# Patient Record
Sex: FEMALE | Race: BLACK OR AFRICAN AMERICAN | NOT HISPANIC OR LATINO | Employment: FULL TIME | URBAN - METROPOLITAN AREA
[De-identification: names, ages, dates, MRNs, and addresses within clinical notes are randomized per-mention and may not be internally consistent; named-entity substitution may affect disease eponyms.]

---

## 2018-04-30 ENCOUNTER — HOSPITAL ENCOUNTER (OUTPATIENT)
Dept: ONCOLOGY | Facility: HOSPITAL | Age: 23
End: 2018-05-04
Attending: INTERNAL MEDICINE | Admitting: INTERNAL MEDICINE

## 2018-04-30 LAB
ABS NEUT (OLG): 8.16 X10(3)/MCL (ref 2.1–9.2)
ALBUMIN SERPL-MCNC: 3.6 GM/DL (ref 3.4–5)
ALBUMIN/GLOB SERPL: 0.9 {RATIO}
ALP SERPL-CCNC: 60 UNIT/L (ref 38–126)
ALT SERPL-CCNC: 26 UNIT/L (ref 12–78)
APPEARANCE, UA: ABNORMAL
AST SERPL-CCNC: 31 UNIT/L (ref 15–37)
BACTERIA SPEC CULT: ABNORMAL /HPF
BASOPHILS # BLD AUTO: 0 X10(3)/MCL (ref 0–0.2)
BASOPHILS NFR BLD AUTO: 0 %
BILIRUB SERPL-MCNC: 1.5 MG/DL (ref 0.2–1)
BILIRUB UR QL STRIP: ABNORMAL
BILIRUBIN DIRECT+TOT PNL SERPL-MCNC: 0.4 MG/DL (ref 0–0.2)
BILIRUBIN DIRECT+TOT PNL SERPL-MCNC: 1.1 MG/DL (ref 0–0.8)
BUN SERPL-MCNC: 8 MG/DL (ref 7–18)
CALCIUM SERPL-MCNC: 9 MG/DL (ref 8.5–10.1)
CHLORIDE SERPL-SCNC: 104 MMOL/L (ref 98–107)
CO2 SERPL-SCNC: 25 MMOL/L (ref 21–32)
COLOR UR: ABNORMAL
CREAT SERPL-MCNC: 0.81 MG/DL (ref 0.55–1.02)
ERYTHROCYTE [DISTWIDTH] IN BLOOD BY AUTOMATED COUNT: 13.3 % (ref 11.5–17)
GLOBULIN SER-MCNC: 4.1 GM/DL (ref 2.4–3.5)
GLUCOSE (UA): NEGATIVE
GLUCOSE SERPL-MCNC: 130 MG/DL (ref 74–106)
HCT VFR BLD AUTO: 40.1 % (ref 37–47)
HGB BLD-MCNC: 12.8 GM/DL (ref 12–16)
HGB UR QL STRIP: ABNORMAL
KETONES UR QL STRIP: ABNORMAL
LEUKOCYTE ESTERASE UR QL STRIP: ABNORMAL
LIPASE SERPL-CCNC: 70 UNIT/L (ref 73–393)
LYMPHOCYTES # BLD AUTO: 1.1 X10(3)/MCL (ref 0.6–4.6)
LYMPHOCYTES NFR BLD AUTO: 11 %
MCH RBC QN AUTO: 29 PG (ref 27–31)
MCHC RBC AUTO-ENTMCNC: 31.9 GM/DL (ref 33–36)
MCV RBC AUTO: 90.9 FL (ref 80–94)
MONOCYTES # BLD AUTO: 0.7 X10(3)/MCL (ref 0.1–1.3)
MONOCYTES NFR BLD AUTO: 7 %
NEUTROPHILS # BLD AUTO: 8.16 X10(3)/MCL (ref 2.1–9.2)
NEUTROPHILS NFR BLD AUTO: 81 %
NITRITE UR QL STRIP: POSITIVE
PH UR STRIP: 6 [PH] (ref 5–9)
PLATELET # BLD AUTO: 199 X10(3)/MCL (ref 130–400)
PMV BLD AUTO: 10.7 FL (ref 9.4–12.4)
POTASSIUM SERPL-SCNC: 3.8 MMOL/L (ref 3.5–5.1)
PROT SERPL-MCNC: 7.7 GM/DL (ref 6.4–8.2)
PROT UR QL STRIP: ABNORMAL
RBC # BLD AUTO: 4.41 X10(6)/MCL (ref 4.2–5.4)
RBC #/AREA URNS HPF: 13 /HPF (ref 0–2)
SODIUM SERPL-SCNC: 137 MMOL/L (ref 136–145)
SP GR UR STRIP: 1.02 (ref 1–1.03)
SQUAMOUS EPITHELIAL, UA: 6 /HPF (ref 0–4)
UROBILINOGEN UR STRIP-ACNC: 2
WBC # SPEC AUTO: 10 X10(3)/MCL (ref 4.5–11.5)
WBC #/AREA URNS HPF: 735 /HPF (ref 0–3)

## 2018-05-02 LAB
BUN SERPL-MCNC: 7 MG/DL (ref 7–18)
CALCIUM SERPL-MCNC: 8.7 MG/DL (ref 8.5–10.1)
CHLORIDE SERPL-SCNC: 105 MMOL/L (ref 98–107)
CO2 SERPL-SCNC: 27 MMOL/L (ref 21–32)
CREAT SERPL-MCNC: 0.63 MG/DL (ref 0.55–1.02)
CREAT/UREA NIT SERPL: 11.1
ERYTHROCYTE [DISTWIDTH] IN BLOOD BY AUTOMATED COUNT: 13.2 % (ref 11.5–17)
GLUCOSE SERPL-MCNC: 111 MG/DL (ref 74–106)
HCT VFR BLD AUTO: 36.7 % (ref 37–47)
HGB BLD-MCNC: 11.3 GM/DL (ref 12–16)
MCH RBC QN AUTO: 28.6 PG (ref 27–31)
MCHC RBC AUTO-ENTMCNC: 30.8 GM/DL (ref 33–36)
MCV RBC AUTO: 92.9 FL (ref 80–94)
PLATELET # BLD AUTO: 162 X10(3)/MCL (ref 130–400)
PMV BLD AUTO: 10.7 FL (ref 9.4–12.4)
POTASSIUM SERPL-SCNC: 3.7 MMOL/L (ref 3.5–5.1)
RBC # BLD AUTO: 3.95 X10(6)/MCL (ref 4.2–5.4)
SODIUM SERPL-SCNC: 141 MMOL/L (ref 136–145)
WBC # SPEC AUTO: 7.2 X10(3)/MCL (ref 4.5–11.5)

## 2018-05-03 LAB
ABS NEUT (OLG): 4.06 X10(3)/MCL (ref 2.1–9.2)
BASOPHILS # BLD AUTO: 0 X10(3)/MCL (ref 0–0.2)
BASOPHILS NFR BLD AUTO: 0 %
BUN SERPL-MCNC: 6 MG/DL (ref 7–18)
CALCIUM SERPL-MCNC: 8.6 MG/DL (ref 8.5–10.1)
CHLORIDE SERPL-SCNC: 107 MMOL/L (ref 98–107)
CO2 SERPL-SCNC: 23 MMOL/L (ref 21–32)
CREAT SERPL-MCNC: 0.64 MG/DL (ref 0.55–1.02)
CREAT/UREA NIT SERPL: 9.4
EOSINOPHIL # BLD AUTO: 0.1 X10(3)/MCL (ref 0–0.9)
EOSINOPHIL NFR BLD AUTO: 2 %
ERYTHROCYTE [DISTWIDTH] IN BLOOD BY AUTOMATED COUNT: 13.3 % (ref 11.5–17)
GLUCOSE SERPL-MCNC: 93 MG/DL (ref 74–106)
HCT VFR BLD AUTO: 35.2 % (ref 37–47)
HGB BLD-MCNC: 11.1 GM/DL (ref 12–16)
LYMPHOCYTES # BLD AUTO: 1.6 X10(3)/MCL (ref 0.6–4.6)
LYMPHOCYTES NFR BLD AUTO: 25 %
MCH RBC QN AUTO: 28.3 PG (ref 27–31)
MCHC RBC AUTO-ENTMCNC: 31.5 GM/DL (ref 33–36)
MCV RBC AUTO: 89.8 FL (ref 80–94)
MONOCYTES # BLD AUTO: 0.5 X10(3)/MCL (ref 0.1–1.3)
MONOCYTES NFR BLD AUTO: 8 %
NEUTROPHILS # BLD AUTO: 4.06 X10(3)/MCL (ref 2.1–9.2)
NEUTROPHILS NFR BLD AUTO: 64 %
PLATELET # BLD AUTO: 187 X10(3)/MCL (ref 130–400)
PMV BLD AUTO: 11.5 FL (ref 9.4–12.4)
POTASSIUM SERPL-SCNC: 3.5 MMOL/L (ref 3.5–5.1)
RBC # BLD AUTO: 3.92 X10(6)/MCL (ref 4.2–5.4)
SODIUM SERPL-SCNC: 139 MMOL/L (ref 136–145)
WBC # SPEC AUTO: 6.3 X10(3)/MCL (ref 4.5–11.5)

## 2018-05-04 LAB
ABS NEUT (OLG): 2.77 X10(3)/MCL (ref 2.1–9.2)
ALBUMIN SERPL-MCNC: 2.8 GM/DL (ref 3.4–5)
ALBUMIN/GLOB SERPL: 0.7 RATIO (ref 1.1–2)
ALP SERPL-CCNC: 58 UNIT/L (ref 38–126)
ALT SERPL-CCNC: 33 UNIT/L (ref 12–78)
AST SERPL-CCNC: 28 UNIT/L (ref 15–37)
BILIRUB SERPL-MCNC: 0.6 MG/DL (ref 0.2–1)
BILIRUBIN DIRECT+TOT PNL SERPL-MCNC: 0.1 MG/DL (ref 0–0.5)
BILIRUBIN DIRECT+TOT PNL SERPL-MCNC: 0.5 MG/DL (ref 0–0.8)
BUN SERPL-MCNC: 6 MG/DL (ref 7–18)
CALCIUM SERPL-MCNC: 8.6 MG/DL (ref 8.5–10.1)
CHLORIDE SERPL-SCNC: 107 MMOL/L (ref 98–107)
CO2 SERPL-SCNC: 24 MMOL/L (ref 21–32)
CREAT SERPL-MCNC: 0.55 MG/DL (ref 0.55–1.02)
EOSINOPHIL NFR BLD MANUAL: 7 % (ref 0–8)
ERYTHROCYTE [DISTWIDTH] IN BLOOD BY AUTOMATED COUNT: 13.2 % (ref 11.5–17)
GLOBULIN SER-MCNC: 3.8 GM/DL (ref 2.4–3.5)
GLUCOSE SERPL-MCNC: 102 MG/DL (ref 74–106)
HCT VFR BLD AUTO: 33.3 % (ref 37–47)
HGB BLD-MCNC: 10.5 GM/DL (ref 12–16)
LYMPHOCYTES NFR BLD MANUAL: 32 % (ref 13–40)
MAGNESIUM SERPL-MCNC: 2.1 MG/DL (ref 1.8–2.4)
MCH RBC QN AUTO: 28.5 PG (ref 27–31)
MCHC RBC AUTO-ENTMCNC: 31.5 GM/DL (ref 33–36)
MCV RBC AUTO: 90.2 FL (ref 80–94)
MONOCYTES NFR BLD MANUAL: 8 % (ref 2–11)
NEUTROPHILS NFR BLD MANUAL: 53 % (ref 47–80)
PLATELET # BLD AUTO: 197 X10(3)/MCL (ref 130–400)
PLATELET # BLD EST: NORMAL 10*3/UL
PMV BLD AUTO: 10.9 FL (ref 7.4–10.4)
POTASSIUM SERPL-SCNC: 3.9 MMOL/L (ref 3.5–5.1)
PROT SERPL-MCNC: 6.6 GM/DL (ref 6.4–8.2)
RBC # BLD AUTO: 3.69 X10(6)/MCL (ref 4.2–5.4)
SODIUM SERPL-SCNC: 140 MMOL/L (ref 136–145)
WBC # SPEC AUTO: 5.1 X10(3)/MCL (ref 4.5–11.5)

## 2021-09-29 LAB
PAP RECOMMENDATION EXT: NORMAL
PAP SMEAR: NORMAL

## 2022-01-02 ENCOUNTER — HISTORICAL (OUTPATIENT)
Dept: ADMINISTRATIVE | Facility: HOSPITAL | Age: 27
End: 2022-01-02

## 2022-01-02 LAB
FLUAV AG UPPER RESP QL IA.RAPID: NEGATIVE
FLUBV AG UPPER RESP QL IA.RAPID: NEGATIVE
SARS-COV-2 RNA RESP QL NAA+PROBE: DETECTED

## 2022-02-17 ENCOUNTER — HISTORICAL (OUTPATIENT)
Dept: ADMINISTRATIVE | Facility: HOSPITAL | Age: 27
End: 2022-02-17

## 2022-04-11 ENCOUNTER — HISTORICAL (OUTPATIENT)
Dept: ADMINISTRATIVE | Facility: HOSPITAL | Age: 27
End: 2022-04-11
Payer: COMMERCIAL

## 2022-04-28 VITALS
SYSTOLIC BLOOD PRESSURE: 144 MMHG | WEIGHT: 293 LBS | OXYGEN SATURATION: 100 % | HEIGHT: 72 IN | BODY MASS INDEX: 39.68 KG/M2 | DIASTOLIC BLOOD PRESSURE: 90 MMHG

## 2022-04-30 NOTE — DISCHARGE SUMMARY
Patient:   Joana Gallo             MRN: [id number]            FIN: [id number]               Age:   23 years     Sex:  Female     :  1995   Associated Diagnoses:   None   Author:   Jamie RADER, Ata MURILLO      Discharge Information      Discharge Summary Information   ADMIT/DISCHARGE DATE   Admit Date: 2018  Discharge Date: 2018     Procedures   No procedures recorded for this visit.      Hospital Course   Admission/discharge diagnosees    Acute non-complicated pyelonephritis  Sepsis secondary to E. coli pyelonephritis    Hospital course: 23-year-old woman who presented to the ED on 2018 with fever and flank pain. On the ER admission, she was febrile and tachycardic. Urinalysis was consistent with an acute cystitis. A CT scan of the abdomen demonstrated acute pyelonephritis. She was admitted and started on intravenous anti-infective therapy. A pelvic ultrasound was negative.  Urine culture grew out pansensitive E. coli.  She will be discharged on a 7-10 day course with Levaquin.       Physical Examination      Vital Signs (last 24 hrs)_____  Last Charted___________  Temp Oral     36.6 DegC  (MAY 04 05:18)  Heart Rate Peripheral   75 bpm  (MAY 04 05:18)  Resp Rate         18 br/min  (MAY 03 20:00)  SBP      113 mmHg  (MAY 04 05:18)  DBP      72 mmHg  (MAY 04 05:18)  SpO2      97 %  (MAY 04 05:18)     General:  Alert and oriented, No acute distress.    Neck:  Supple, Non-tender.    Respiratory:  Lungs are clear to auscultation, Respirations are non-labored.    Cardiovascular:  Normal rate, Regular rhythm.    Gastrointestinal:  Soft, Non-tender.       Discharge Plan   Discharge Summary Plan   Discharge disposition: discharge to home.     Orders     Orders   Patient Care:  Give all scheduled vaccinations prior to discharge. (Order): 2018 7:05 CDT, Give all scheduled vaccinations prior to discharge.  Discontinue IV (Order): 2018 7:05 CDT  Pharmacy:  Levaquin 500 mg oral tablet  (Prescribe): 500 mg = 1 tab(s), Oral, q24hr, X 5 day(s), # 5 tab(s), 0 Refill(s)  METRONIDAZOLE 500 MG TABLET (Discontinue): 5/4/2018 7:03 CDT  Hydroco-Acetamin 7.5-325 Tab (Discontinue): 5/4/2018 7:03 CDT  FLUCONAZOLE 150 MG TABLET (Discontinue): 5/4/2018 7:03 CDT  Ibuprofen 800 Mg Tablet (Discontinue): 5/4/2018 7:03 CDT  Amoxicillin 500 Mg Capsule (Discontinue): 5/4/2018 7:03 CDT  naproxen 375 mg oral tablet (Discontinue): 5/4/2018 7:03 CDT  Keflex 500 mg oral capsule (Discontinue): 5/4/2018 7:04 CDT  Admit/Transfer/Discharge:  Discharge Activity (Order): Exercise as Tolerated  Discharge (Order): Home, Give all scheduled vaccinations prior to discharge..        Education and Follow-up   Counseled: patient, family, regarding diagnosis, regarding treatment, regarding medications.     Discharge Planning: Pyelonephritis, Adult, Follow up with primary care provider Within 1 week, time spent on discharge disposition included the following: final examination of the patient; discussion of the hospital stay; instructions for continuing care; final diagnoses; patient/family counseling; preparation of discharge records; preparation of prescriptions; referral forms; chart review.  Total time spent on discharge disposition was 33 minutes.   .

## 2022-04-30 NOTE — H&P
Patient:   Joana Gallo             MRN: [id number]            FIN: [id number]               Age:   23 years     Sex:  Female     :  1995   Associated Diagnoses:   None   Author:   Shoaib Hannah MD      DATE OF ADMIT: 2018 5:14  REFERRAL SOURCE: Natalie Eldridge MD   PCP: Non-staff MD     CHIEF COMPLAINT: fever and flank pain     HISTORY OF PRESENTING ILLNESS  Patient is a 23-year-old female with no past medical history report presented to the ER complaining of fever and flank pain.  She reported that since this past Friday she has been having episodic fever, and bilateral flank pain that is nonradiating.  She denied any nausea or vomiting.  Her symptoms progress or she reported to the ER this evening for further evaluation.  On arrival to the ER she was febrile at 38.4 and tachycardic but hemodynamically stable.  Laboratory work only significant for urinary tract infection with 735 WBCs in her urine and 3+ leukocyte esterase and positive nitrites.  A CT of her abdomen was obtained which showed increased enhancement in the right kidney concerning for pyelonephritis.  She is admitted to the hospitalist service for pyelonephritis, and apparently the patient was very nauseated in the ER and unable to tolerate anything by mouth, and for this reason will be admitted for IV antibiotics.  Currently at this time she states she is not nauseated and had no vomiting.    REVIEW OF SYSTEMS  Except as documented, all other systems reviewed and negative    PAST MEDICAL HISTORY  None    PAST SURGICAL HISTORY  None reported    FAMILY HISTORY  Reviewed, noncontributory to current condition.    SOCIAL HISTORY  Denies tobacco and drug use, occasional alcohol use    ALLERGIES  No Known Medication Allergies    HOME MEDICATIONS  Provera 10 mg tablet 10 mg = 1 tab(s), Oral, Daily    PHYSICAL EXAM  Temp Oral     37.5 DegC  ( 23:55)  Heart Rate Peripheral   92 bpm  (MAY 01 00:00)  Resp Rate         22 br/min   (MAY 01 00:00)  SBP      114 mmHg  (MAY 01 00:00)  DBP      L 59mmHg  (MAY 01 00:00)  SpO2   99 %  (MAY 01 00:00)  GENERAL: awake, alert, oriented and in no acute distress, morbidly obese  HEENT: NC/AT, EOMI, Pupils equal, CN 2-12 intact   LUNGS: CTA B/L, no rales or rhonchi, moving air well with no  resp distress  CVS: Regular rate and rhythm, normal peripheral perfusion  ABD: Soft, non-tender, non-distended, bowel sounds present  EXTREMITIES: no clubbing or cyanosis  SKIN: Warm, dry. No rashes or lesions  NEURO: AAOx3, no focal neurological deficit  PSYCHIATRIC: Cooperative    LABS  Chemistry Hematology/Coagulation   Sodium Lvl: 137 mmol/L (04/30/18 17:45:45) WBC: 10 x10(3)/mcL (04/30/18 17:10:19)   Potassium Lvl: 3.8 mmol/L (04/30/18 17:45:45) RBC: 4.41 x10(6)/mcL (04/30/18 17:10:19)   Chloride: 104 mmol/L (04/30/18 17:45:45) Hgb: 12.8 gm/dL (04/30/18 17:10:19)   CO2: 25 mmol/L (04/30/18 17:45:45) Hct: 40.1 % (04/30/18 17:10:19)   Calcium Lvl: 9 mg/dL (04/30/18 17:45:45) Platelet: 199 x10(3)/mcL (04/30/18 17:10:19)   Glucose Lvl: 130 mg/dL High (04/30/18 17:45:45) MCV: 90.9 fL (04/30/18 17:10:19)   BUN: 8 mg/dL (04/30/18 17:45:45) MCH: 29 pg (04/30/18 17:10:19)   Creatinine: 0.81 mg/dL (04/30/18 17:45:45) MCHC: 31.9 gm/dL Low (04/30/18 17:10:19)   eGFR-AA: >60 (04/30/18 17:45:46) RDW: 13.3 % (04/30/18 17:10:19)   eGFR-PEGGY: >60 (04/30/18 17:45:48) MPV: 10.7 fL (04/30/18 17:10:19)   Bili Total: 1.5 mg/dL High (04/30/18 17:45:45) Abs Neut: 8.16 x10(3)/mcL (04/30/18 17:10:19)   Bili Direct: 0.4 mg/dL High (04/30/18 17:45:45) Neutro Auto: 81 % (04/30/18 17:10:20)   Bili Indirect: 1.1 mg/dL High (04/30/18 17:45:45) Lymph Auto: 11 % (04/30/18 17:10:20)   AST: 31 unit/L (04/30/18 17:45:45) Mono Auto: 7 % (04/30/18 17:10:20)   ALT: 26 unit/L (04/30/18 17:45:45) Basophil Auto: 0 % (04/30/18 17:10:20)   Alk Phos: 60 unit/L (04/30/18 17:45:45) Abs Neutro: 8.16 x10(3)/mcL (04/30/18 17:10:20)   Total Protein: 7.7 gm/dL  (04/30/18 17:45:45) Abs Lymph: 1.1 x10(3)/mcL (04/30/18 17:10:20)   Albumin Lvl: 3.6 gm/dL (04/30/18 17:45:45) Abs Mono: 0.7 x10(3)/mcL (04/30/18 17:10:20)   Globulin: 4.1 gm/dL High (04/30/18 17:45:45) Abs Baso: 0 x10(3)/mcL (04/30/18 17:10:20)   A/G Ratio: 0.9 (04/30/18 17:45:45)    Lipase Lvl: 70 unit/L Low (04/30/18 17:45:47)      RADIOLOGY  CT of the abdomen showed mildly heterogeneous enhancement of the right kidney, or artifact versus pyelonephritis, correlate with urinalysis      ASSESSMENT  1.  Pyelonephritis  2.  Fever and bilateral flank pain secondary to above  3.  Mildly elevated bilirubin likely secondary to infection  4.  Morbid obesity    PLAN  - Urine culture sent   - Start emperic antibiotics for pyelonephritis  - Continue IV fluids  - Symptomatic management including antinausea medication and pain medication as needed    ADVANCED DIRECTIVES: None full code  VTE PROPHYLAXIS: SCDs and ambulate     Total time spent on admission: 71 minutes

## 2022-04-30 NOTE — ED PROVIDER NOTES
"   Patient:   Joana Gallo             MRN: [id number]            FIN: [id number]               Age:   23 years     Sex:  Female     :  1995   Associated Diagnoses:   Pyelonephritis, acute   Author:   Natalie Eldridge MD      Basic Information   Time seen: Date & time 2018 16:26:00.   History source: Patient.   Arrival mode: Private vehicle, walking.   History limitation: None.      History of Present Illness   The patient presents with Patient states right sided flank pain and fever x several days. denies any nausea or vomiting (allyn, NP).  and     22 yo female presents with fever and right "side" pain for 2-3 days. Symptoms associated with a decreased intake of food due to nausea and she has been able to take only sips of water or Gatorade. The following day, , she got a bad headache at work, along with fever and chills, and constant, stabbing pain on her "right side". The Pt thought it was a bladder infection, took some Advil with minimal relief. Symptoms continue to worsen and thus she presents for evaluation. She denies having prior pelvic or bladder infections. Pt denies vaginal discharge or bleeding, changes in bowel movements, or productive cough. No history of kidney stones.  The onset was 3  days ago.  The course/duration of symptoms is constant.  The character of symptoms is "stabbing".  The degree at onset was moderate.  The Location of pain at onset was right, abdominal and flank.  The degree at present is moderate.  The Location of pain at present is right, abdominal and flank.  Radiating pain: none. The exacerbating factor is eating.  The relieving factor is none.  Therapy today: none.  Risk factors consist of obesity.  Associated symptoms: nausea, fever, chills, headache, decreased PO intake and denies vaginal discharge or bleeding, changes in bowel movements, or productive cough.        Review of Systems   Constitutional symptoms:  fever, chills.   Skin symptoms:  No " rash,    Eye symptoms:  No recent vision problems,    ENMT symptoms:  No sore throat,    Respiratory symptoms:  denies productive cough, No shortness of breath,    Cardiovascular symptoms:  No chest pain, no syncope.    Gastrointestinal symptoms:  nausea, abdominal pain, decreased PO intake, denies changes in bowel movements, no vomiting, no diarrhea, no constipation.    Genitourinary symptoms:  denies vaginal bleeding or discharge, No dysuria,    Neurologic symptoms:  headache.      Health Status   Allergies: No known allergies.   Medications:  (Selected)   Documented Medications  Documented  Provera 10 mg tablet:   metformin 1,000 mg tablet: .   Immunizations: Per nurse's notes.      Past Medical/ Family/ Social History   Medical history: Negative.   Surgical history: Negative.   Family history: Not significant.   Social history: Not significant.      Physical Examination               Vital Signs   Vital Signs   4/30/2018 16:25 CDT      Temperature Oral          38.8 DegC  HI                             Temperature Oral (calculated)             101.84 DegF                             Peripheral Pulse Rate     114 bpm  HI                             Respiratory Rate          17 br/min                             SpO2                      97 %                             Oxygen Therapy            Room air                             Systolic Blood Pressure   130 mmHg                             Diastolic Blood Pressure  79 mmHg  .   Measurements   4/30/2018 16:25 CDT      Weight Dosing             142 kg                             Weight Measured and Calculated in Lbs     313.05 lb                             Height/Length Dosing      182 cm  .   Basic Oxygen Information   4/30/2018 16:25 CDT      SpO2                      97 %                             Oxygen Therapy            Room air  .   General:  Alert, no acute distress, obese.    Skin:  Warm, dry, intact, no pallor, no rash, normal for ethnicity.     Head:  Normocephalic, atraumatic.    Neck:  Supple, trachea midline, no meningismus.    Eye:  Extraocular movements are intact, normal conjunctiva.    Ears, nose, mouth and throat:  Mouth: lips dry.   Cardiovascular:  Tachycardia.   Respiratory:  Lungs are clear to auscultation, respirations are non-labored, breath sounds are equal, Symmetrical chest wall expansion.    Gastrointestinal:  Soft, Non distended, Normal bowel sounds, Obese, Tenderness: Suprapubic, right lower quadrant, left lower quadrant, Guarding: Negative, Rebound: Negative, Signs: McBurney's negative.    Back:  Normal range of motion, No costovertebral angle tenderness,    Musculoskeletal:  Normal ROM.   Neurological:  Alert and oriented to person, place, time, and situation, No focal neurological deficit observed.    Psychiatric:  Cooperative.      Medical Decision Making   Differential Diagnosis:  Abdominal pain, Appendicitis, renal stone, ureteral stone, pancreatitis, urinary tract infection, pyelonephritis, gastroenteritis, diverticulitis, ectopic pregnancy.    Rationale:  24 yo F, UPT negative here for right flank pain and fever. Febrile, hemodynamically stable with a nonsurgical abdomen. Abdominal pain worse in the suprapubic region, no TTP at McBurney's point. Labs obtained at triage significant for nitrites, WBCs and RBCs in the urine. Culture sent and Rocephin will be given. WBC count wnl, no changes in BMs. I will repeat serial abdominal exams and hold on further imaging at this time. Pain and fever control given. Patient attempting to drink water. Reassess. .   Documents reviewed:  Emergency department nurses' notes.   Results review:  Lab results : [facility]   4/30/2018 16:48 CDT      Sodium Lvl                137 mmol/L                             Potassium Lvl             3.8 mmol/L                             Chloride                  104 mmol/L                             CO2                       25.0 mmol/L                              Calcium Lvl               9.0 mg/dL                             Glucose Lvl               130 mg/dL  HI                             BUN                       8.0 mg/dL                             Creatinine                0.81 mg/dL                             eGFR-AA                   >60 mL/min/1.73 m2  NA                             eGFR-PEGGY                  >60 mL/min/1.73 m2  NA                             Bili Total                1.5 mg/dL  HI                             Bili Direct               0.40 mg/dL  HI                             Bili Indirect             1.10 mg/dL  HI                             AST                       31 unit/L                             ALT                       26 unit/L                             Alk Phos                  60 unit/L                             Total Protein             7.7 gm/dL                             Albumin Lvl               3.60 gm/dL                             Globulin                  4.10 gm/dL  HI                             A/G Ratio                 0.9  NA                             Lipase Lvl                70 unit/L  LOW                             WBC                       10.0 x10(3)/mcL                             RBC                       4.41 x10(6)/mcL                             Hgb                       12.8 gm/dL                             Hct                       40.1 %                             Platelet                  199 x10(3)/mcL                             MCV                       90.9 fL                             MCH                       29.0 pg                             MCHC                      31.9 gm/dL  LOW                             RDW                       13.3 %                             MPV                       10.7 fL                             Abs Neut                  8.16 x10(3)/mcL                             Neutro Auto               81 %  NA                             Lymph Auto                 11 %  NA                             Mono Auto                 7 %  NA                             Basophil Auto             0 %  NA                             Abs Neutro                8.16 x10(3)/mcL                             Abs Lymph                 1.1 x10(3)/mcL                             Abs Mono                  0.7 x10(3)/mcL                             Abs Baso                  0.0 x10(3)/mcL    4/30/2018 16:44 CDT      U beta hCG Ql POC         Negative    4/30/2018 16:40 CDT      UA Appear                 TURBID                             UA Color                  ORANGE                             UA Spec Grav              1.021                             UA Bili                   1+                             UA pH                     6.0                             UA Urobilinogen           2.0                             UA Blood                  2+                             UA Glucose                Negative                             UA Ketones                Trace                             UA Protein                2+                             UA Nitrite                Positive                             UA Leuk Est               3+                             UA WBC                    735 /HPF  HI                             UA RBC                    13 /HPF  HI                             UA Bacteria               NONE SEEN /HPF                             UA Squam Epithelial       6 /HPF  HI  .      Reexamination/ Reevaluation   Time: 4/30/2018 21:20:00 .   Notes: Patient was feeling improved after Toradol, however she states she feels as though she has fever again.  She is currently febrile. She was not able to drink water reports feeling nauseated.  I will give her a dose of Zofran along with a liter of normal saline and Ofirmev for fever control. Pelvic exam not concerning as the source of infection.  Abdomen is still soft and nonsurgical without tenderness over  McBurney's point.  Again she localizes her abdominal pain to the suprapubic region.  I have discussed with her patient with CT scan of the abdomen and pelvis to assess her appendix, however I feel as though her fever is coming from UTI.  She verbalizes understanding to return to the emergency department at any time for any worsening pain, continued fever, intractable nausea or vomiting or other acute issues for reexamination and probable CT scan of the abdomen and pelvis to assess the appendix.  Again I do not feel this is warranted at this time to which she is amenable.  I will give her another PO challenge after Zofran.   Time: 4/30/2018 23:38:00 .   Course: unchanged.   Assessment: exam unchanged.   Notes: Patient is not able to tolerate PO.  She states she is still nauseated and she and family do not believe she will be able to drink at home. I will give her a dose of promethazine and I have contacted hospital medicine for admission for IV fluids and antibiotic therapy.  Hospital medicine does request CT of the abdomen and pelvis with IV contrast to assess for obstruction and renal abscess.  It has been ordered.  Patient is amenable to admission.      Impression and Plan   Diagnosis   Pyelonephritis, acute ([id number])      Calls-Consults   -  4/30/2018 23:35:00 , Hospital medicine, Dr. Hannah to admit.    Plan   Condition: Stable.    Disposition: Admit time  4/30/2018 23:38:00, Place in Observation Unit, [place] RADER, Shoaib GANNON    Prescriptions: [facility] prescriptions   Pharmacy:  Keflex 500 mg oral capsule (Prescribe): 500 mg = 1 cap(s), Oral, q8hr, X 7 day(s), # 21 cap(s), 0 Refill(s)  Zofran ODT 4 mg oral tablet, disintegrating (Prescribe): 4 mg = 1 tab(s), Oral, q6hr, PRN PRN as needed for nausea/vomiting, X 5 day(s), # 20 tab(s), 0 Refill(s)  naproxen 375 mg oral tablet (Prescribe): 375 mg = 1 tab(s), Oral, BID, PRN PRN as needed for pain, X 5 day(s), # 10 tab(s), 0 Refill(s).    Patient was given the  following educational materials: Pyelonephritis, Adult.    Counseled: Patient, Family, Regarding diagnosis, Regarding diagnostic results, Regarding treatment plan, Regarding prescription, Patient indicated understanding of instructions.    Notes: I, Shoaib Daniel, acted solely as a scribe for and in the presence of Dr. Eldridge who performed the service., I, Dr. Natalie Eldridge, personally evaluated and examined this patient and agree with the documentation as above. .

## 2022-09-13 RX ORDER — ONDANSETRON 4 MG/1
8 TABLET, FILM COATED ORAL
COMMUNITY
End: 2022-09-21

## 2022-09-13 RX ORDER — MEDROXYPROGESTERONE ACETATE 10 MG/1
1 TABLET ORAL DAILY
COMMUNITY
Start: 2022-03-15

## 2022-09-13 RX ORDER — METFORMIN HYDROCHLORIDE 500 MG/1
2 TABLET ORAL DAILY
COMMUNITY
End: 2022-09-21

## 2022-09-14 ENCOUNTER — DOCUMENTATION ONLY (OUTPATIENT)
Dept: ADMINISTRATIVE | Facility: HOSPITAL | Age: 27
End: 2022-09-14
Payer: COMMERCIAL

## 2022-09-21 ENCOUNTER — LAB VISIT (OUTPATIENT)
Dept: LAB | Facility: HOSPITAL | Age: 27
End: 2022-09-21
Attending: INTERNAL MEDICINE
Payer: COMMERCIAL

## 2022-09-21 ENCOUNTER — OFFICE VISIT (OUTPATIENT)
Dept: INTERNAL MEDICINE | Facility: CLINIC | Age: 27
End: 2022-09-21
Payer: COMMERCIAL

## 2022-09-21 VITALS
BODY MASS INDEX: 39.68 KG/M2 | DIASTOLIC BLOOD PRESSURE: 86 MMHG | HEIGHT: 72 IN | OXYGEN SATURATION: 97 % | SYSTOLIC BLOOD PRESSURE: 138 MMHG | WEIGHT: 293 LBS | HEART RATE: 96 BPM | TEMPERATURE: 98 F

## 2022-09-21 DIAGNOSIS — Z00.00 WELLNESS EXAMINATION: ICD-10-CM

## 2022-09-21 DIAGNOSIS — E28.2 PCOS (POLYCYSTIC OVARIAN SYNDROME): ICD-10-CM

## 2022-09-21 DIAGNOSIS — E66.01 MORBID OBESITY WITH BMI OF 50.0-59.9, ADULT: ICD-10-CM

## 2022-09-21 DIAGNOSIS — A60.04 HERPES SIMPLEX VULVOVAGINITIS: ICD-10-CM

## 2022-09-21 DIAGNOSIS — Z00.00 WELLNESS EXAMINATION: Primary | ICD-10-CM

## 2022-09-21 LAB
ALBUMIN SERPL-MCNC: 3.9 GM/DL (ref 3.5–5)
ALBUMIN/GLOB SERPL: 1.2 RATIO (ref 1.1–2)
ALP SERPL-CCNC: 51 UNIT/L (ref 40–150)
ALT SERPL-CCNC: 18 UNIT/L (ref 0–55)
AST SERPL-CCNC: 24 UNIT/L (ref 5–34)
BASOPHILS # BLD AUTO: 0.03 X10(3)/MCL (ref 0–0.2)
BASOPHILS NFR BLD AUTO: 0.6 %
BILIRUBIN DIRECT+TOT PNL SERPL-MCNC: 0.8 MG/DL
BUN SERPL-MCNC: 7.4 MG/DL (ref 7–18.7)
CALCIUM SERPL-MCNC: 9.3 MG/DL (ref 8.4–10.2)
CHLORIDE SERPL-SCNC: 103 MMOL/L (ref 98–107)
CHOLEST SERPL-MCNC: 147 MG/DL
CHOLEST/HDLC SERPL: 4 {RATIO} (ref 0–5)
CO2 SERPL-SCNC: 24 MMOL/L (ref 22–29)
CREAT SERPL-MCNC: 0.67 MG/DL (ref 0.55–1.02)
DEPRECATED CALCIDIOL+CALCIFEROL SERPL-MC: 7.2 NG/ML (ref 30–80)
EOSINOPHIL # BLD AUTO: 0.13 X10(3)/MCL (ref 0–0.9)
EOSINOPHIL NFR BLD AUTO: 2.6 %
ERYTHROCYTE [DISTWIDTH] IN BLOOD BY AUTOMATED COUNT: 13.3 % (ref 11.5–17)
EST. AVERAGE GLUCOSE BLD GHB EST-MCNC: 211.6 MG/DL
GFR SERPLBLD CREATININE-BSD FMLA CKD-EPI: >60 MLS/MIN/1.73/M2
GLOBULIN SER-MCNC: 3.2 GM/DL (ref 2.4–3.5)
GLUCOSE SERPL-MCNC: 198 MG/DL (ref 74–100)
HBA1C MFR BLD: 9 %
HCT VFR BLD AUTO: 39 % (ref 37–47)
HDLC SERPL-MCNC: 38 MG/DL (ref 35–60)
HGB BLD-MCNC: 12.4 GM/DL (ref 12–16)
IMM GRANULOCYTES # BLD AUTO: 0.02 X10(3)/MCL (ref 0–0.04)
IMM GRANULOCYTES NFR BLD AUTO: 0.4 %
LDLC SERPL CALC-MCNC: 90 MG/DL (ref 50–140)
LYMPHOCYTES # BLD AUTO: 1.97 X10(3)/MCL (ref 0.6–4.6)
LYMPHOCYTES NFR BLD AUTO: 38.8 %
MCH RBC QN AUTO: 30 PG (ref 27–31)
MCHC RBC AUTO-ENTMCNC: 31.8 MG/DL (ref 33–36)
MCV RBC AUTO: 94.2 FL (ref 80–94)
MONOCYTES # BLD AUTO: 0.32 X10(3)/MCL (ref 0.1–1.3)
MONOCYTES NFR BLD AUTO: 6.3 %
NEUTROPHILS # BLD AUTO: 2.6 X10(3)/MCL (ref 2.1–9.2)
NEUTROPHILS NFR BLD AUTO: 51.3 %
NRBC BLD AUTO-RTO: 0 %
PLATELET # BLD AUTO: 265 X10(3)/MCL (ref 130–400)
PMV BLD AUTO: 11.1 FL (ref 7.4–10.4)
POTASSIUM SERPL-SCNC: 4.3 MMOL/L (ref 3.5–5.1)
PROT SERPL-MCNC: 7.1 GM/DL (ref 6.4–8.3)
RBC # BLD AUTO: 4.14 X10(6)/MCL (ref 4.2–5.4)
SODIUM SERPL-SCNC: 136 MMOL/L (ref 136–145)
T4 FREE SERPL-MCNC: 0.95 NG/DL (ref 0.7–1.48)
TRIGL SERPL-MCNC: 96 MG/DL (ref 37–140)
TSH SERPL-ACNC: 1.19 UIU/ML (ref 0.35–4.94)
VLDLC SERPL CALC-MCNC: 19 MG/DL
WBC # SPEC AUTO: 5.1 X10(3)/MCL (ref 4.5–11.5)

## 2022-09-21 PROCEDURE — 99385 PR PREVENTIVE VISIT,NEW,18-39: ICD-10-PCS | Mod: ,,, | Performed by: INTERNAL MEDICINE

## 2022-09-21 PROCEDURE — 3079F PR MOST RECENT DIASTOLIC BLOOD PRESSURE 80-89 MM HG: ICD-10-PCS | Mod: CPTII,,, | Performed by: INTERNAL MEDICINE

## 2022-09-21 PROCEDURE — 3075F PR MOST RECENT SYSTOLIC BLOOD PRESS GE 130-139MM HG: ICD-10-PCS | Mod: CPTII,,, | Performed by: INTERNAL MEDICINE

## 2022-09-21 PROCEDURE — 80061 LIPID PANEL: CPT

## 2022-09-21 PROCEDURE — 36415 COLL VENOUS BLD VENIPUNCTURE: CPT

## 2022-09-21 PROCEDURE — 80053 COMPREHEN METABOLIC PANEL: CPT

## 2022-09-21 PROCEDURE — 1159F MED LIST DOCD IN RCRD: CPT | Mod: CPTII,,, | Performed by: INTERNAL MEDICINE

## 2022-09-21 PROCEDURE — 84439 ASSAY OF FREE THYROXINE: CPT

## 2022-09-21 PROCEDURE — 84443 ASSAY THYROID STIM HORMONE: CPT

## 2022-09-21 PROCEDURE — 83036 HEMOGLOBIN GLYCOSYLATED A1C: CPT

## 2022-09-21 PROCEDURE — 3079F DIAST BP 80-89 MM HG: CPT | Mod: CPTII,,, | Performed by: INTERNAL MEDICINE

## 2022-09-21 PROCEDURE — 3008F PR BODY MASS INDEX (BMI) DOCUMENTED: ICD-10-PCS | Mod: CPTII,,, | Performed by: INTERNAL MEDICINE

## 2022-09-21 PROCEDURE — 1160F PR REVIEW ALL MEDS BY PRESCRIBER/CLIN PHARMACIST DOCUMENTED: ICD-10-PCS | Mod: CPTII,,, | Performed by: INTERNAL MEDICINE

## 2022-09-21 PROCEDURE — 99385 PREV VISIT NEW AGE 18-39: CPT | Mod: ,,, | Performed by: INTERNAL MEDICINE

## 2022-09-21 PROCEDURE — 1160F RVW MEDS BY RX/DR IN RCRD: CPT | Mod: CPTII,,, | Performed by: INTERNAL MEDICINE

## 2022-09-21 PROCEDURE — 1159F PR MEDICATION LIST DOCUMENTED IN MEDICAL RECORD: ICD-10-PCS | Mod: CPTII,,, | Performed by: INTERNAL MEDICINE

## 2022-09-21 PROCEDURE — 82306 VITAMIN D 25 HYDROXY: CPT

## 2022-09-21 PROCEDURE — 85025 COMPLETE CBC W/AUTO DIFF WBC: CPT

## 2022-09-21 PROCEDURE — 3008F BODY MASS INDEX DOCD: CPT | Mod: CPTII,,, | Performed by: INTERNAL MEDICINE

## 2022-09-21 PROCEDURE — 3075F SYST BP GE 130 - 139MM HG: CPT | Mod: CPTII,,, | Performed by: INTERNAL MEDICINE

## 2022-09-21 NOTE — ASSESSMENT & PLAN NOTE

## 2022-09-21 NOTE — ASSESSMENT & PLAN NOTE
-labs are ordered and will review   -patient is advised on importance of watching her carbohydrate intake and saturated fat intake, making the right nutritional choices and exercising on a regular basis  -up-to-date with the screening

## 2022-09-21 NOTE — ASSESSMENT & PLAN NOTE
-may get started on metformin to help with her PCOS, however holding off for now until review of labs in case she is also diabetic.

## 2022-09-21 NOTE — PROGRESS NOTES
Subjective:      Patient ID: Joana Gallo is a 27 y.o. female.    Chief Complaint: Establish Care    Joana is a 26 yo female here today to establish care.  She was told in the past that her hemoglobin A1c was elevated however does not have a diagnosis of diabetes given to her officially.  Does have PCOS and genital herpes which is currently well controlled.  Patient is not needing any chronic suppression.    Family history:  Negative except for cardiomegaly in father   Social history:  Occasional alcohol, no smoking  Surgical history:  Noncontributory    The patient's Health Maintenance was reviewed and the following appears to be due at this time:   Health Maintenance Due   Topic Date Due    Lipid Panel  Never done    Influenza Vaccine (1) Never done        Past Medical History:  Past Medical History:   Diagnosis Date    Allergy     Depression     PCOS (polycystic ovarian syndrome)      History reviewed. No pertinent surgical history.  Review of patient's allergies indicates:  No Known Allergies  Social History     Socioeconomic History    Marital status:    Tobacco Use    Smoking status: Never    Smokeless tobacco: Never   Substance and Sexual Activity    Alcohol use: Yes     Comment: once a month     History reviewed. No pertinent family history.    Review of Systems    A comprehensive review of systems was performed and is negative except for that stated above  Objective:   /86 (BP Location: Left arm, Patient Position: Sitting, BP Method: Large (Manual))   Pulse 96   Temp 98.3 °F (36.8 °C) (Temporal)   Ht 6' (1.829 m)   Wt (!) 167.5 kg (369 lb 3.2 oz)   LMP 08/15/2022 (Exact Date)   SpO2 97%   BMI 50.07 kg/m²     Physical Exam  Constitutional:       Appearance: Normal appearance. She is obese.   HENT:      Head: Normocephalic and atraumatic.      Nose: Nose normal.      Mouth/Throat:      Mouth: Mucous membranes are moist.      Pharynx: Oropharynx is clear.   Eyes:      Extraocular  Movements: Extraocular movements intact.      Pupils: Pupils are equal, round, and reactive to light.   Cardiovascular:      Rate and Rhythm: Normal rate and regular rhythm.      Pulses: Normal pulses.   Pulmonary:      Effort: Pulmonary effort is normal.      Breath sounds: Normal breath sounds.   Abdominal:      General: Bowel sounds are normal.      Palpations: Abdomen is soft.   Musculoskeletal:         General: Normal range of motion.      Cervical back: Normal range of motion and neck supple.   Skin:     General: Skin is warm.   Neurological:      General: No focal deficit present.      Mental Status: She is alert and oriented to person, place, and time. Mental status is at baseline.   Psychiatric:         Mood and Affect: Mood normal.     Assessment/ Plan:   1. Wellness examination  Assessment & Plan:  -labs are ordered and will review   -patient is advised on importance of watching her carbohydrate intake and saturated fat intake, making the right nutritional choices and exercising on a regular basis  -up-to-date with the screening      2. PCOS (polycystic ovarian syndrome)  Assessment & Plan:  -may get started on metformin to help with her PCOS, however holding off for now until review of labs in case she is also diabetic.      3. Herpes simplex vulvovaginitis  Assessment & Plan:  -currently not on any chronic suppressive therapy   -educated on importance of safe sex practices         4. Morbid obesity with BMI of 50.0-59.9, adult  Assessment & Plan:  Body mass index is 50.07 kg/m².  Goal BMI <30.  Exercise 5 times a week for 30 minutes per day.  Avoid soda, simple sugars, excessive rice, potatoes or bread. Limit fast foods and fried foods.  Choose complex carbs in moderation (example: green vegetables, beans, oatmeal). Eat plenty of fresh fruits and vegetables with lean meats daily.  Do not skip meals. Eat a balanced portion size.  Avoid fad diets. Consider permanent healthy life style changes.

## 2022-09-21 NOTE — ASSESSMENT & PLAN NOTE
-currently not on any chronic suppressive therapy   -educated on importance of safe sex practices

## 2022-11-25 LAB
LEFT EYE DM RETINOPATHY: NEGATIVE
RIGHT EYE DM RETINOPATHY: NEGATIVE

## 2022-11-28 ENCOUNTER — TELEPHONE (OUTPATIENT)
Dept: INTERNAL MEDICINE | Facility: CLINIC | Age: 27
End: 2022-11-28
Payer: COMMERCIAL

## 2022-11-28 RX ORDER — VALACYCLOVIR HYDROCHLORIDE 1 G/1
1000 TABLET, FILM COATED ORAL EVERY 12 HOURS
Qty: 10 TABLET | Refills: 0 | Status: SHIPPED | OUTPATIENT
Start: 2022-11-28 | End: 2023-10-23 | Stop reason: SDUPTHER

## 2022-11-28 NOTE — TELEPHONE ENCOUNTER
Not sure which hemoglobin A1c patient is asking for.  I do not have any recent labs on her.    She does have a history of genital herpes however not on any chronic suppression.  If this is an acute outbreak, prescription has been sent for Valtrex to her pharmacy.  Please notify patient, thank you

## 2022-11-28 NOTE — TELEPHONE ENCOUNTER
Spoke with Pt, let her know her medication was sent. A telemed visit was made for Ms. Bernard for Wednesday with Hung to discuses lab work. Pt did see she had a high A1C, Pt is aware she will be prescribed medication at her next visit.

## 2022-11-28 NOTE — TELEPHONE ENCOUNTER
----- Message from Azul Velasquez sent at 11/28/2022 11:37 AM CST -----  Regarding: medication  Pt called asking for medication for herpes, she stated she had discussed this with Dr Vega.Jojo on [address]    She also would like her A1C lab results    Please call [phone]

## 2022-11-30 ENCOUNTER — OFFICE VISIT (OUTPATIENT)
Dept: INTERNAL MEDICINE | Facility: CLINIC | Age: 27
End: 2022-11-30
Payer: COMMERCIAL

## 2022-11-30 VITALS — BODY MASS INDEX: 53.71 KG/M2 | SYSTOLIC BLOOD PRESSURE: 138 MMHG | WEIGHT: 293 LBS | DIASTOLIC BLOOD PRESSURE: 86 MMHG

## 2022-11-30 DIAGNOSIS — K58.9 IRRITABLE BOWEL SYNDROME, UNSPECIFIED TYPE: ICD-10-CM

## 2022-11-30 DIAGNOSIS — E11.65 TYPE 2 DIABETES MELLITUS WITH HYPERGLYCEMIA, WITHOUT LONG-TERM CURRENT USE OF INSULIN: Primary | ICD-10-CM

## 2022-11-30 DIAGNOSIS — E66.01 MORBID OBESITY WITH BMI OF 50.0-59.9, ADULT: ICD-10-CM

## 2022-11-30 DIAGNOSIS — E55.9 VITAMIN D INSUFFICIENCY: ICD-10-CM

## 2022-11-30 PROCEDURE — 3008F BODY MASS INDEX DOCD: CPT | Mod: CPTII,95,,

## 2022-11-30 PROCEDURE — 1160F RVW MEDS BY RX/DR IN RCRD: CPT | Mod: CPTII,95,,

## 2022-11-30 PROCEDURE — 99214 OFFICE O/P EST MOD 30 MIN: CPT | Mod: 95,,,

## 2022-11-30 PROCEDURE — 1160F PR REVIEW ALL MEDS BY PRESCRIBER/CLIN PHARMACIST DOCUMENTED: ICD-10-PCS | Mod: CPTII,95,,

## 2022-11-30 PROCEDURE — 1159F MED LIST DOCD IN RCRD: CPT | Mod: CPTII,95,,

## 2022-11-30 PROCEDURE — 3075F PR MOST RECENT SYSTOLIC BLOOD PRESS GE 130-139MM HG: ICD-10-PCS | Mod: CPTII,95,,

## 2022-11-30 PROCEDURE — 1159F PR MEDICATION LIST DOCUMENTED IN MEDICAL RECORD: ICD-10-PCS | Mod: CPTII,95,,

## 2022-11-30 PROCEDURE — 3008F PR BODY MASS INDEX (BMI) DOCUMENTED: ICD-10-PCS | Mod: CPTII,95,,

## 2022-11-30 PROCEDURE — 3079F DIAST BP 80-89 MM HG: CPT | Mod: CPTII,95,,

## 2022-11-30 PROCEDURE — 99214 PR OFFICE/OUTPT VISIT, EST, LEVL IV, 30-39 MIN: ICD-10-PCS | Mod: 95,,,

## 2022-11-30 PROCEDURE — 3075F SYST BP GE 130 - 139MM HG: CPT | Mod: CPTII,95,,

## 2022-11-30 PROCEDURE — 3079F PR MOST RECENT DIASTOLIC BLOOD PRESSURE 80-89 MM HG: ICD-10-PCS | Mod: CPTII,95,,

## 2022-11-30 RX ORDER — LANCETS
EACH MISCELLANEOUS
Qty: 100 EACH | Refills: 5 | Status: SHIPPED | OUTPATIENT
Start: 2022-11-30

## 2022-11-30 RX ORDER — INSULIN PUMP SYRINGE, 3 ML
EACH MISCELLANEOUS
Qty: 1 EACH | Refills: 0 | Status: SHIPPED | OUTPATIENT
Start: 2022-11-30 | End: 2023-11-30

## 2022-11-30 RX ORDER — FLASH GLUCOSE SENSOR
1 KIT MISCELLANEOUS
Qty: 1 KIT | Refills: 6 | Status: SHIPPED | OUTPATIENT
Start: 2022-11-30 | End: 2023-07-06

## 2022-11-30 RX ORDER — METFORMIN HYDROCHLORIDE 500 MG/1
1000 TABLET, EXTENDED RELEASE ORAL NIGHTLY
Qty: 180 TABLET | Refills: 3 | Status: SHIPPED | OUTPATIENT
Start: 2022-11-30 | End: 2023-04-27

## 2022-11-30 RX ORDER — DICYCLOMINE HYDROCHLORIDE 10 MG/1
10 CAPSULE ORAL
Qty: 120 CAPSULE | Refills: 0 | Status: SHIPPED | OUTPATIENT
Start: 2022-11-30 | End: 2022-12-30

## 2022-11-30 RX ORDER — ERGOCALCIFEROL 1.25 MG/1
50000 CAPSULE ORAL
Qty: 12 CAPSULE | Refills: 1 | Status: SHIPPED | OUTPATIENT
Start: 2022-11-30 | End: 2023-05-29

## 2022-11-30 NOTE — ASSESSMENT & PLAN NOTE
Lab Results   Component Value Date    HGBA1C 9.0 (H) 09/21/2022   -newly diagnosed type 2 diabetic , repeat HGB A1c in 3 months  -initiate metformin XR 1000 mg nightly  -stressed importance of lifestyle modification trial with at least 10 lb weight loss for next visit   -referred to diabetic educator   -prescription for diabetic supplies sent to pharmacy

## 2022-11-30 NOTE — PROGRESS NOTES
Patient ID: Joana Gallo is a 27 y.o. female.    Chief Complaint: Follow-up      The patient location is: work   Visit type: audiovisual    Each patient to whom he or she provides medical services by telemedicine is:  (1) informed of the relationship between the physician and patient and the respective role of any other health care provider with respect to management of the patient; and (2) notified that he or she may decline to receive medical services by telemedicine and may withdraw from such care at any time.    27-year-old female seen today via telemedicine for abnormal lab follow-up visit.  Medical comorbidities include PCOS and genital herpes not needing chronic suppression.  Last visit seen for establishment of care with wellness labs.  However subsequent labs revealed HGB A1c 9.0 as well as vitamin-D level 7.2.  Patient without prior diagnosis of type 2 diabetes or vitamin-D insufficiency.  Lengthy discussion had with patient regarding need for treatment for type 2 diabetes for which will initiate her on metformin XR 1000 mg nightly with three-month lifestyle modification trial.  Encouraged to lose 10 lb.  Referral placed for diabetic educator and orders placed for diabetic supplies.  Prescription for vitamin-D 28967 units sent to her pharmacy with patient understanding needs to take once weekly.  The today include occasional loose bowels with patient stating previously told she may have IBS.  We will give trial on Bentyl as well as recommended symptoms journaling.  Otherwise no other acute medical concerns noted.    Wellness:9/21/22      MEDICAL HISTORY:    Past Medical History:   Diagnosis Date    Allergy     Depression     Genital herpes     PCOS (polycystic ovarian syndrome)       No past surgical history on file.   Social History     Tobacco Use    Smoking status: Never    Smokeless tobacco: Never   Substance Use Topics    Alcohol use: Yes     Comment: once a month          Health Maintenance Due    Topic Date Due    Hepatitis C Screening  Never done    Influenza Vaccine (1) Never done          Patient Care Team:  Paz Vega MD as PCP - General (Internal Medicine)      Review of Systems   Constitutional:  Negative for fatigue and fever.   HENT:  Negative for congestion, rhinorrhea, sore throat and trouble swallowing.    Eyes:  Negative for redness and visual disturbance.   Respiratory:  Negative for cough, chest tightness and shortness of breath.    Cardiovascular:  Negative for chest pain and palpitations.   Gastrointestinal:  Positive for diarrhea (Intermittent). Negative for abdominal pain, constipation, nausea and vomiting.   Genitourinary:  Negative for dysuria, flank pain, frequency and urgency.   Musculoskeletal:  Negative for arthralgias, gait problem and myalgias.   Skin:  Negative for rash and wound.   Neurological:  Negative for facial asymmetry, speech difficulty, weakness and headaches.   All other systems reviewed and are negative.    Objective:   /86   Wt (!) 179.6 kg (396 lb)   BMI 53.71 kg/m²      Physical Exam      **No physical exam completed due to telemedicine format    Assessment:       ICD-10-CM ICD-9-CM   1. Type 2 diabetes mellitus with hyperglycemia, without long-term current use of insulin  E11.65 250.00     790.29   2. Vitamin D insufficiency  E55.9 268.9   3. Irritable bowel syndrome, unspecified type  K58.9 564.1   4. Morbid obesity with BMI of 50.0-59.9, adult  E66.01 278.01    Z68.43 V85.43        Plan:     Problem List Items Addressed This Visit          Endocrine    Morbid obesity with BMI of 50.0-59.9, adult     -BMI 53.7   -encourage at least 10 lb weight loss for next visit   -low-calorie low carb diet   -encourage some form of routine aerobic exercise 3 to 5 times a week         Vitamin D insufficiency     -vitamin-D 7.2   -initiate on vitamin-D 91857 units weekly         Relevant Medications    ergocalciferol (ERGOCALCIFEROL) 50,000 unit Cap    Type 2  diabetes mellitus with hyperglycemia, without long-term current use of insulin - Primary     Lab Results   Component Value Date    HGBA1C 9.0 (H) 09/21/2022   -newly diagnosed type 2 diabetic , repeat HGB A1c in 3 months  -initiate metformin XR 1000 mg nightly  -stressed importance of lifestyle modification trial with at least 10 lb weight loss for next visit   -referred to diabetic educator   -prescription for diabetic supplies sent to pharmacy           Relevant Medications    metFORMIN (GLUCOPHAGE-XR) 500 MG ER 24hr tablet    blood-glucose meter kit    lancets Misc    blood sugar diagnostic Strp    Other Relevant Orders    Ambulatory referral/consult to Diabetes Education    Hemoglobin A1C       GI    Irritable bowel syndrome     -acute on chronic problem   -initiate on trial on Bentyl   -increase fluid hydration, fiber diet  -symptom journaling         Relevant Medications    dicyclomine (BENTYL) 10 MG capsule          Follow up in about 3 months (around 2/28/2023) for Diabetes, with labs prior.   -plan specifics discussed above    Orders Placed This Encounter    Hemoglobin A1C    Ambulatory referral/consult to Diabetes Education    metFORMIN (GLUCOPHAGE-XR) 500 MG ER 24hr tablet    ergocalciferol (ERGOCALCIFEROL) 50,000 unit Cap    dicyclomine (BENTYL) 10 MG capsule    blood-glucose meter kit    lancets Misc    blood sugar diagnostic Strp        Medication List with Changes/Refills   New Medications    BLOOD SUGAR DIAGNOSTIC STRP    To check BG one times daily, to use with insurance preferred meter    BLOOD-GLUCOSE METER KIT    To check BG one times daily, to use with insurance preferred meter    DICYCLOMINE (BENTYL) 10 MG CAPSULE    Take 1 capsule (10 mg total) by mouth 4 (four) times daily before meals and nightly.    ERGOCALCIFEROL (ERGOCALCIFEROL) 50,000 UNIT CAP    Take 1 capsule (50,000 Units total) by mouth every 7 days.    FLASH GLUCOSE SENSOR (FREESTYLE MIGUEL 14 DAY SENSOR) KIT    1 application by  Misc.(Non-Drug; Combo Route) route every 14 (fourteen) days.    LANCETS MISC    To check BG  one times daily, to use with insurance preferred meter    METFORMIN (GLUCOPHAGE-XR) 500 MG ER 24HR TABLET    Take 2 tablets (1,000 mg total) by mouth every evening.   Current Medications    MEDROXYPROGESTERONE (PROVERA) 10 MG TABLET    Take 1 tablet by mouth once daily at 6am. 14 days q month    VALACYCLOVIR (VALTREX) 1000 MG TABLET    Take 1 tablet (1,000 mg total) by mouth every 12 (twelve) hours.

## 2022-11-30 NOTE — ASSESSMENT & PLAN NOTE
-BMI 53.7   -encourage at least 10 lb weight loss for next visit   -low-calorie low carb diet   -encourage some form of routine aerobic exercise 3 to 5 times a week

## 2022-11-30 NOTE — ASSESSMENT & PLAN NOTE
-acute on chronic problem   -initiate on trial on Bentyl   -increase fluid hydration, fiber diet  -symptom journaling

## 2022-12-20 ENCOUNTER — PATIENT MESSAGE (OUTPATIENT)
Dept: DIABETES | Facility: CLINIC | Age: 27
End: 2022-12-20
Payer: COMMERCIAL

## 2023-02-20 ENCOUNTER — TELEPHONE (OUTPATIENT)
Dept: INTERNAL MEDICINE | Facility: CLINIC | Age: 28
End: 2023-02-20
Payer: COMMERCIAL

## 2023-02-28 ENCOUNTER — TELEPHONE (OUTPATIENT)
Dept: INTERNAL MEDICINE | Facility: CLINIC | Age: 28
End: 2023-02-28

## 2023-03-09 ENCOUNTER — TELEPHONE (OUTPATIENT)
Dept: INTERNAL MEDICINE | Facility: CLINIC | Age: 28
End: 2023-03-09
Payer: COMMERCIAL

## 2023-03-20 ENCOUNTER — TELEPHONE (OUTPATIENT)
Dept: INTERNAL MEDICINE | Facility: CLINIC | Age: 28
End: 2023-03-20
Payer: COMMERCIAL

## 2023-03-20 NOTE — TELEPHONE ENCOUNTER
----- Message from Kelley Donnelly sent at 3/20/2023  3:42 PM CDT -----  Regarding: sinus infection  Slight cough  Green mucus  Sore throat  Ear ache  Sinus/head congestion  Sinus drip                                                  x 1 day      O.t.c.   Nothing      [facility] ([place])  [phone]

## 2023-03-23 ENCOUNTER — TELEPHONE (OUTPATIENT)
Dept: INTERNAL MEDICINE | Facility: CLINIC | Age: 28
End: 2023-03-23
Payer: COMMERCIAL

## 2023-03-23 NOTE — TELEPHONE ENCOUNTER
----- Message from Devang Arciniega MA sent at 3/23/2023  9:48 AM CDT -----  Virtual Visit, Fast [facility] A1C, LOV: 11/30/2022, next appt 03/30/2023.

## 2023-03-29 ENCOUNTER — LAB VISIT (OUTPATIENT)
Dept: LAB | Facility: HOSPITAL | Age: 28
End: 2023-03-29
Payer: COMMERCIAL

## 2023-03-29 DIAGNOSIS — E11.65 TYPE 2 DIABETES MELLITUS WITH HYPERGLYCEMIA, WITHOUT LONG-TERM CURRENT USE OF INSULIN: ICD-10-CM

## 2023-03-29 LAB
EST. AVERAGE GLUCOSE BLD GHB EST-MCNC: 188.6 MG/DL
HBA1C MFR BLD: 8.2 %

## 2023-03-29 PROCEDURE — 36415 COLL VENOUS BLD VENIPUNCTURE: CPT

## 2023-03-29 PROCEDURE — 83036 HEMOGLOBIN GLYCOSYLATED A1C: CPT

## 2023-03-30 ENCOUNTER — OFFICE VISIT (OUTPATIENT)
Dept: INTERNAL MEDICINE | Facility: CLINIC | Age: 28
End: 2023-03-30
Payer: COMMERCIAL

## 2023-03-30 VITALS — WEIGHT: 293 LBS | BODY MASS INDEX: 46.11 KG/M2 | DIASTOLIC BLOOD PRESSURE: 86 MMHG | SYSTOLIC BLOOD PRESSURE: 138 MMHG

## 2023-03-30 DIAGNOSIS — E66.01 MORBID OBESITY DUE TO EXCESS CALORIES: ICD-10-CM

## 2023-03-30 DIAGNOSIS — Z13.5 DIABETIC RETINOPATHY SCREENING: ICD-10-CM

## 2023-03-30 DIAGNOSIS — E11.65 TYPE 2 DIABETES MELLITUS WITH HYPERGLYCEMIA, WITHOUT LONG-TERM CURRENT USE OF INSULIN: Primary | ICD-10-CM

## 2023-03-30 PROCEDURE — 99214 PR OFFICE/OUTPT VISIT, EST, LEVL IV, 30-39 MIN: ICD-10-PCS | Mod: 95,,,

## 2023-03-30 PROCEDURE — 1159F PR MEDICATION LIST DOCUMENTED IN MEDICAL RECORD: ICD-10-PCS | Mod: CPTII,95,,

## 2023-03-30 PROCEDURE — 1160F PR REVIEW ALL MEDS BY PRESCRIBER/CLIN PHARMACIST DOCUMENTED: ICD-10-PCS | Mod: CPTII,95,,

## 2023-03-30 PROCEDURE — 99214 OFFICE O/P EST MOD 30 MIN: CPT | Mod: 95,,,

## 2023-03-30 PROCEDURE — 3008F PR BODY MASS INDEX (BMI) DOCUMENTED: ICD-10-PCS | Mod: CPTII,95,,

## 2023-03-30 PROCEDURE — 3075F SYST BP GE 130 - 139MM HG: CPT | Mod: CPTII,95,,

## 2023-03-30 PROCEDURE — 1159F MED LIST DOCD IN RCRD: CPT | Mod: CPTII,95,,

## 2023-03-30 PROCEDURE — 3008F BODY MASS INDEX DOCD: CPT | Mod: CPTII,95,,

## 2023-03-30 PROCEDURE — 3079F DIAST BP 80-89 MM HG: CPT | Mod: CPTII,95,,

## 2023-03-30 PROCEDURE — 3075F PR MOST RECENT SYSTOLIC BLOOD PRESS GE 130-139MM HG: ICD-10-PCS | Mod: CPTII,95,,

## 2023-03-30 PROCEDURE — 3079F PR MOST RECENT DIASTOLIC BLOOD PRESSURE 80-89 MM HG: ICD-10-PCS | Mod: CPTII,95,,

## 2023-03-30 PROCEDURE — 1160F RVW MEDS BY RX/DR IN RCRD: CPT | Mod: CPTII,95,,

## 2023-03-30 RX ORDER — LANCETS 28 GAUGE
EACH MISCELLANEOUS
COMMUNITY
Start: 2022-11-30

## 2023-03-30 RX ORDER — TIRZEPATIDE 2.5 MG/.5ML
2.5 INJECTION, SOLUTION SUBCUTANEOUS
Qty: 4 PEN | Refills: 0 | Status: SHIPPED | OUTPATIENT
Start: 2023-03-30 | End: 2023-04-27 | Stop reason: SDUPTHER

## 2023-03-30 NOTE — PROGRESS NOTES
Patient ID: Joana Gallo is a 28 y.o. female.    Chief Complaint: Follow-up (3 mnth follow up pt doing well )      The patient location is:Work     Visit type: audiovisual    Face to Face time with patient: 25  35 minutes of total time spent on the encounter, which includes face to face time and non-face to face time preparing to see the patient (eg, review of tests), Obtaining and/or reviewing separately obtained history, Documenting clinical information in the electronic or other health record, Independently interpreting results (not separately reported) and communicating results to the patient/family/caregiver, or Care coordination (not separately reported).     Each patient to whom he or she provides medical services by telemedicine is:  (1) informed of the relationship between the physician and patient and the respective role of any other health care provider with respect to management of the patient; and (2) notified that he or she may decline to receive medical services by telemedicine and may withdraw from such care at any time.      28-year-old female seen today via telemedicine for diabetes follow-up visit.  Medical comorbidities include dm 2, PCOS, morbid obesity, vitamin-D insufficiency, and genital herpes not needing chronic suppression.  Last visit diagnosed with type 2 diabetes with a noted HGB A1c of 9.0.  Subsequently referred to diabetic educator and initiated on metformin XR 1000 mg .  Most recent A1c improved to 8.2, however will start her on Mounjaro 2.5 mg with a 1 month follow-up for titration up.  Hopefully will aid in control as well as weight loss.  Otherwise no other acute medical concerns noted.       Wellness:9/21/22    MEDICAL HISTORY:    Past Medical History:   Diagnosis Date    Allergy     Depression     Genital herpes     Morbid obesity due to excess calories     PCOS (polycystic ovarian syndrome)       History reviewed. No pertinent surgical history.   Social History      Tobacco Use    Smoking status: Never    Smokeless tobacco: Never   Substance Use Topics    Alcohol use: Yes     Comment: once a month    Drug use: Never          Health Maintenance Due   Topic Date Due    Hepatitis C Screening  Never done    Diabetes Urine Screening  Never done    Pneumococcal Vaccines (Age 0-64) (1 - PCV) Never done    Foot Exam  Never done    Eye Exam  Never done    Influenza Vaccine (1) Never done          Patient Care Team:  Paz Vega MD as PCP - General (Internal Medicine)      Review of Systems   Constitutional:  Negative for fatigue and fever.   HENT:  Negative for congestion, rhinorrhea, sore throat and trouble swallowing.    Eyes:  Negative for redness and visual disturbance.   Respiratory:  Negative for cough, chest tightness and shortness of breath.    Cardiovascular:  Negative for chest pain and palpitations.   Gastrointestinal:  Negative for abdominal pain, constipation, diarrhea, nausea and vomiting.   Genitourinary:  Negative for dysuria, flank pain, frequency and urgency.   Musculoskeletal:  Negative for arthralgias, gait problem and myalgias.   Skin:  Negative for rash and wound.   Neurological:  Negative for facial asymmetry, speech difficulty, weakness and headaches.   All other systems reviewed and are negative.    Objective:   /86   Wt (!) 154.2 kg (340 lb)   LMP 02/22/2023 (Exact Date)   BMI 46.11 kg/m²      Physical Exam      **No physical exam completed due to telemedicine format    Assessment:       ICD-10-CM ICD-9-CM   1. Type 2 diabetes mellitus with hyperglycemia, without long-term current use of insulin  E11.65 250.00     790.29   2. Morbid obesity due to excess calories  E66.01 278.01   3. Diabetic retinopathy screening  Z13.5 V80.2        Plan:     Problem List Items Addressed This Visit          Ophtho    Diabetic retinopathy screening     -patient reports up-to-date  -request records from [facility]              Endocrine    Morbid obesity  due to excess calories (Chronic)     - BMI 46.11   -encourage low-calorie low carb diet   -encourage some form of exercise for weight loss   -initiating on Mounjaro for diabetes hopefully helps with weight loss as well           Relevant Medications    tirzepatide (MOUNJARO) 2.5 mg/0.5 mL PnIj    Type 2 diabetes mellitus with hyperglycemia, without long-term current use of insulin - Primary     Lab Results   Component Value Date    HGBA1C 8.2 (H) 03/29/2023   -improved from previous 9.0, repeat for next visit   -recently diagnosed and referred to diabetic educator  -currently only on metformin XR 1000 mg nightly, continue  -add Mounjaro 2.5 mg weekly         Relevant Medications    tirzepatide (MOUNJARO) 2.5 mg/0.5 mL PnIj    Other Relevant Orders    Microalbumin/Creatinine Ratio, Urine    Hemoglobin A1C          Follow up in about 4 weeks (around 4/27/2023) for Medication Evaluation, Diabetes, Virtual Visit.   -plan specifics discussed above    Orders Placed This Encounter    Microalbumin/Creatinine Ratio, Urine    Hemoglobin A1C    tirzepatide (MOUNJARO) 2.5 mg/0.5 mL PnIj        Medication List with Changes/Refills   New Medications    TIRZEPATIDE (MOUNJARO) 2.5 MG/0.5 ML PNIJ    Inject 2.5 mg into the skin every 7 days.   Current Medications    BLOOD SUGAR DIAGNOSTIC STRP    To check BG one times daily, to use with insurance preferred meter    BLOOD-GLUCOSE METER KIT    To check BG one times daily, to use with insurance preferred meter    ERGOCALCIFEROL (ERGOCALCIFEROL) 50,000 UNIT CAP    Take 1 capsule (50,000 Units total) by mouth every 7 days.    FLASH GLUCOSE SENSOR (FREESTYLE MIGUEL 14 DAY SENSOR) KIT    1 application by Misc.(Non-Drug; Combo Route) route every 14 (fourteen) days.    FREESTYLE LANCETS 28 GAUGE LANCETS    Apply topically.    LANCETS MISC    To check BG  one times daily, to use with insurance preferred meter    MEDROXYPROGESTERONE (PROVERA) 10 MG TABLET    Take 1 tablet by mouth once daily at  6am. 14 days q month    METFORMIN (GLUCOPHAGE-XR) 500 MG ER 24HR TABLET    Take 2 tablets (1,000 mg total) by mouth every evening.    VALACYCLOVIR (VALTREX) 1000 MG TABLET    Take 1 tablet (1,000 mg total) by mouth every 12 (twelve) hours.

## 2023-03-30 NOTE — LETTER
AUTHORIZATION FOR RELEASE OF   CONFIDENTIAL INFORMATION    Dear [facility] ,    We are seeing Joana Gallo, date of birth 1995, in the clinic at [place] [address] [facility]. Paz Vega MD is the patient's PCP. Joana Gallo has an outstanding lab/procedure at the time we reviewed her chart. In order to help keep her health information updated, she has authorized us to request the following medical record(s):        (  )  MAMMOGRAM                                      (  )  COLONOSCOPY      (  )  PAP SMEAR                                          (  )  OUTSIDE LAB RESULTS     (  )  DEXA SCAN                                          ( X )  EYE EXAM            (  )  FOOT EXAM                                          (  )  ENTIRE RECORD     (  )  OUTSIDE IMMUNIZATIONS                 (  )  _______________         Please fax records to [facility], Reshma Arun Bhanushali, MD, [phone]               Patient Name: Joana Gallo  : 1995  Patient Phone #: [phone]

## 2023-03-31 PROBLEM — Z13.5 DIABETIC RETINOPATHY SCREENING: Status: ACTIVE | Noted: 2023-03-31

## 2023-03-31 NOTE — ASSESSMENT & PLAN NOTE
Lab Results   Component Value Date    HGBA1C 8.2 (H) 03/29/2023   -improved from previous 9.0, repeat for next visit   -recently diagnosed and referred to diabetic educator  -currently only on metformin XR 1000 mg nightly, continue  -add Mounjaro 2.5 mg weekly

## 2023-03-31 NOTE — ASSESSMENT & PLAN NOTE
- BMI 46.11   -encourage low-calorie low carb diet   -encourage some form of exercise for weight loss   -initiating on Mounjaro for diabetes hopefully helps with weight loss as well

## 2023-04-04 ENCOUNTER — PATIENT MESSAGE (OUTPATIENT)
Dept: ADMINISTRATIVE | Facility: HOSPITAL | Age: 28
End: 2023-04-04
Payer: COMMERCIAL

## 2023-04-06 ENCOUNTER — PATIENT OUTREACH (OUTPATIENT)
Dept: ADMINISTRATIVE | Facility: HOSPITAL | Age: 28
End: 2023-04-06
Payer: COMMERCIAL

## 2023-04-06 ENCOUNTER — TELEPHONE (OUTPATIENT)
Dept: INTERNAL MEDICINE | Facility: CLINIC | Age: 28
End: 2023-04-06
Payer: COMMERCIAL

## 2023-04-06 NOTE — TELEPHONE ENCOUNTER
Mounjaro approved   CaseId:[id number];Status:Approved;Review Type:Prior Auth;Coverage Start Date:04/06/2023;Coverage End Date:04/05/2024;

## 2023-04-06 NOTE — PROGRESS NOTES
Population Health Outreach. The following record(s)  below were uploaded for Health Maintenance .    DM EYE EXAM 11/25/2022

## 2023-04-19 ENCOUNTER — TELEPHONE (OUTPATIENT)
Dept: INTERNAL MEDICINE | Facility: CLINIC | Age: 28
End: 2023-04-19
Payer: COMMERCIAL

## 2023-04-19 NOTE — TELEPHONE ENCOUNTER
----- Message from Paola Hannah sent at 4/19/2023 10:37 AM CDT -----  Regarding: labs  Patient has virtual visit on 4/27 she said she didn't need labs before because appt is to discuss monyro.  Is this correct?

## 2023-04-27 ENCOUNTER — OFFICE VISIT (OUTPATIENT)
Dept: INTERNAL MEDICINE | Facility: CLINIC | Age: 28
End: 2023-04-27
Payer: COMMERCIAL

## 2023-04-27 VITALS — DIASTOLIC BLOOD PRESSURE: 86 MMHG | WEIGHT: 293 LBS | BODY MASS INDEX: 46.11 KG/M2 | SYSTOLIC BLOOD PRESSURE: 138 MMHG

## 2023-04-27 DIAGNOSIS — E66.01 MORBID OBESITY DUE TO EXCESS CALORIES: ICD-10-CM

## 2023-04-27 DIAGNOSIS — E11.65 TYPE 2 DIABETES MELLITUS WITH HYPERGLYCEMIA, WITHOUT LONG-TERM CURRENT USE OF INSULIN: ICD-10-CM

## 2023-04-27 DIAGNOSIS — R45.89 NEED FOR EMOTIONAL SUPPORT: ICD-10-CM

## 2023-04-27 PROCEDURE — 1159F MED LIST DOCD IN RCRD: CPT | Mod: CPTII,95,,

## 2023-04-27 PROCEDURE — 1159F PR MEDICATION LIST DOCUMENTED IN MEDICAL RECORD: ICD-10-PCS | Mod: CPTII,95,,

## 2023-04-27 PROCEDURE — 1160F RVW MEDS BY RX/DR IN RCRD: CPT | Mod: CPTII,95,,

## 2023-04-27 PROCEDURE — 99214 OFFICE O/P EST MOD 30 MIN: CPT | Mod: 95,,,

## 2023-04-27 PROCEDURE — 3079F DIAST BP 80-89 MM HG: CPT | Mod: CPTII,95,,

## 2023-04-27 PROCEDURE — 3075F SYST BP GE 130 - 139MM HG: CPT | Mod: CPTII,95,,

## 2023-04-27 PROCEDURE — 1160F PR REVIEW ALL MEDS BY PRESCRIBER/CLIN PHARMACIST DOCUMENTED: ICD-10-PCS | Mod: CPTII,95,,

## 2023-04-27 PROCEDURE — 3008F BODY MASS INDEX DOCD: CPT | Mod: CPTII,95,,

## 2023-04-27 PROCEDURE — 3079F PR MOST RECENT DIASTOLIC BLOOD PRESSURE 80-89 MM HG: ICD-10-PCS | Mod: CPTII,95,,

## 2023-04-27 PROCEDURE — 3075F PR MOST RECENT SYSTOLIC BLOOD PRESS GE 130-139MM HG: ICD-10-PCS | Mod: CPTII,95,,

## 2023-04-27 PROCEDURE — 99214 PR OFFICE/OUTPT VISIT, EST, LEVL IV, 30-39 MIN: ICD-10-PCS | Mod: 95,,,

## 2023-04-27 PROCEDURE — 3008F PR BODY MASS INDEX (BMI) DOCUMENTED: ICD-10-PCS | Mod: CPTII,95,,

## 2023-04-27 RX ORDER — METFORMIN HYDROCHLORIDE 500 MG/1
500 TABLET, EXTENDED RELEASE ORAL NIGHTLY
Qty: 90 TABLET | Refills: 3 | Status: SHIPPED | OUTPATIENT
Start: 2023-04-27 | End: 2023-12-18

## 2023-04-27 RX ORDER — TIRZEPATIDE 5 MG/.5ML
5 INJECTION, SOLUTION SUBCUTANEOUS
Qty: 4 PEN | Refills: 0 | Status: SHIPPED | OUTPATIENT
Start: 2023-04-27 | End: 2023-05-18 | Stop reason: SDUPTHER

## 2023-04-27 NOTE — PROGRESS NOTES
Patient ID: Joana Gallo is a 28 y.o. female.    Chief Complaint: No chief complaint on file.      Transitional Care Note    Family and/or Caretaker present at visit?  {YES:[id number]}.  Diagnostic tests reviewed/disposition: {[facility] DIAGNOSTIC TESTS:63761}.  Disease/illness education: ***  Home health/community services discussion/referrals: {[facility] HOME HEALTH:38169}.   Establishment or re-establishment of referral orders for community resources: {[facility] ESTABLISHMENT:58790}.   Discussion with other health care providers: {[facility] DISCUSSION:88308}.   I have reviewed the following:     Details / Date    []   Labs     []   Micro     []   Pathology     []   Imaging     []   Cardiology Procedures     []   Other         28-year-old female seen today via telemedicine for diabetes follow-up visit.  Medical comorbidities include dm 2, PCOS, morbid obesity, vitamin-D insufficiency, and genital herpes not needing chronic suppression.  Last visit diagnosed with type 2 diabetes with a noted HGB A1c of 9.0.  Subsequently referred to diabetic educator and initiated on metformin XR 1000 mg .  Most recent A1c improved to 8.2, however will start her on Mounjaro 2.5 mg with a 1 month follow-up for titration up.  Hopefully will aid in control as well as weight loss.  Otherwise no other acute medical concerns noted.       Wellness:9/21/22      MEDICAL HISTORY:    Past Medical History:   Diagnosis Date    Allergy     Depression     Genital herpes     Morbid obesity due to excess calories     PCOS (polycystic ovarian syndrome)       No past surgical history on file.   Social History     Tobacco Use    Smoking status: Never    Smokeless tobacco: Never   Substance Use Topics    Alcohol use: Yes     Comment: once a month    Drug use: Never          Health Maintenance Due   Topic Date Due    Hepatitis C Screening  Never done    Diabetes Urine Screening  Never done    Pneumococcal Vaccines (Age 0-64) (1 - PCV) Never done    Foot Exam  Never done     Influenza Vaccine (1) Never done          Patient Care Team:  Paz Vega MD as PCP - General (Internal Medicine)  Shashank Blake OD (Optometry)      Review of Systems    Objective:   LMP 02/22/2023 (Exact Date)      Physical Exam      Assessment:   No diagnosis found.     Plan:     Problem List Items Addressed This Visit    None         No follow-ups on file.   -plan specifics discussed above          Medication List with Changes/Refills   Current Medications    BLOOD SUGAR DIAGNOSTIC STRP    To check BG one times daily, to use with insurance preferred meter    BLOOD-GLUCOSE METER KIT    To check BG one times daily, to use with insurance preferred meter    ERGOCALCIFEROL (ERGOCALCIFEROL) 50,000 UNIT CAP    Take 1 capsule (50,000 Units total) by mouth every 7 days.    FLASH GLUCOSE SENSOR (FREESTYLE MIGUEL 14 DAY SENSOR) KIT    1 application by Misc.(Non-Drug; Combo Route) route every 14 (fourteen) days.    FREESTYLE LANCETS 28 GAUGE LANCETS    Apply topically.    LANCETS MISC    To check BG  one times daily, to use with insurance preferred meter    MEDROXYPROGESTERONE (PROVERA) 10 MG TABLET    Take 1 tablet by mouth once daily at 6am. 14 days q month    METFORMIN (GLUCOPHAGE-XR) 500 MG ER 24HR TABLET    Take 2 tablets (1,000 mg total) by mouth every evening.    TIRZEPATIDE (MOUNJARO) 2.5 MG/0.5 ML PNIJ    Inject 2.5 mg into the skin every 7 days.    VALACYCLOVIR (VALTREX) 1000 MG TABLET    Take 1 tablet (1,000 mg total) by mouth every 12 (twelve) hours.

## 2023-04-27 NOTE — PROGRESS NOTES
Patient ID: Joana Gallo is a 28 y.o. female.    Chief Complaint: Diabetes (Medication follow up )      The patient location is: Work  Visit type: audiovisual    Face to Face time with patient: 25  35 minutes of total time spent on the encounter, which includes face to face time and non-face to face time preparing to see the patient (eg, review of tests), Obtaining and/or reviewing separately obtained history, Documenting clinical information in the electronic or other health record, Independently interpreting results (not separately reported) and communicating results to the patient/family/caregiver, or Care coordination (not separately reported).     Each patient to whom he or she provides medical services by telemedicine is:  (1) informed of the relationship between the physician and patient and the respective role of any other health care provider with respect to management of the patient; and (2) notified that he or she may decline to receive medical services by telemedicine and may withdraw from such care at any time.      28-year-old female seen today via telemedicine for diabetes follow-up visit.  Medical comorbidities include dm 2, PCOS, morbid obesity, vitamin-D insufficiency, and genital herpes not needing chronic suppression.  Last visit initiate trial of 2.5 mg for an A1c of 8.2, in addition to her metformin XR 1000 mg nightly.  Today reports has been tolerating medication well did have proximally on weight loss.  Most recent blood sugar this morning reported in the 110s.  Discussed increasing Mounjaro and preemptively decrease metformin.  Only concern is requesting letter stating need for support animal.  Discussed anxiety/depression, however patient not interested in medication.  Referral placed to Psychiatry for formal evaluation and [place] appropriateness of letter for emotional support animal.  Otherwise patient fairly stable with no other acute medical concerns noted.        Wellness:9/21/22        MEDICAL HISTORY:    Past Medical History:   Diagnosis Date    Allergy     Depression     Genital herpes     Morbid obesity due to excess calories     PCOS (polycystic ovarian syndrome)       History reviewed. No pertinent surgical history.   Social History     Tobacco Use    Smoking status: Never    Smokeless tobacco: Never   Substance Use Topics    Alcohol use: Yes     Comment: once a month    Drug use: Never          Health Maintenance Due   Topic Date Due    Hepatitis C Screening  Never done    Diabetes Urine Screening  Never done    Pneumococcal Vaccines (Age 0-64) (1 - PCV) Never done    Foot Exam  Never done    Influenza Vaccine (1) Never done          Patient Care Team:  Paz Vega MD as PCP - General (Internal Medicine)  Shashank Blake OD (Optometry)      Review of Systems   Constitutional:  Negative for fatigue and fever.   HENT:  Negative for congestion, rhinorrhea, sore throat and trouble swallowing.    Eyes:  Negative for redness and visual disturbance.   Respiratory:  Negative for cough, chest tightness and shortness of breath.    Cardiovascular:  Negative for chest pain and palpitations.   Gastrointestinal:  Negative for abdominal pain, constipation, diarrhea, nausea and vomiting.   Endocrine: Negative for polydipsia, polyphagia and polyuria.   Genitourinary:  Negative for dysuria, flank pain, frequency and urgency.   Musculoskeletal:  Negative for arthralgias, gait problem and myalgias.   Skin:  Negative for rash and wound.   Neurological:  Negative for facial asymmetry, speech difficulty, weakness and headaches.   Psychiatric/Behavioral:  Negative for confusion, decreased concentration, dysphoric mood and suicidal ideas. The patient is not nervous/anxious.    All other systems reviewed and are negative.    Objective:   /86   Wt (!) 154.2 kg (340 lb)   LMP 02/22/2023 (Exact Date)   BMI 46.11 kg/m²      Physical Exam      **No physical exam completed due to  telemedicine format    Assessment:       ICD-10-CM ICD-9-CM   1. Type 2 diabetes mellitus with hyperglycemia, without long-term current use of insulin  E11.65 250.00     790.29   2. Morbid obesity due to excess calories  E66.01 278.01   3. Need for emotional support  R45.89 799.29        Plan:     Problem List Items Addressed This Visit          Psychiatric    Need for emotional support     -Requesting letter for emotional support animal  -discussed anxiety/depression, however patient not interested in pharmacologic intervention at this time    -referred to Psychiatry for formal evaluation and appropriateness of letter              Endocrine    Morbid obesity due to excess calories (Chronic)    Relevant Medications    tirzepatide (MOUNJARO) 5 mg/0.5 mL PnIj    Type 2 diabetes mellitus with hyperglycemia, without long-term current use of insulin     -previous HGB A1c 8.2 (03/20/2023   -last visit initiated on Mounjaro 2.5 mg and currently tolerating, increase to 5mg weekly   -metformin XR 1000 mg nightly, decrease to 500 mg preemptively since increasing Mounjaro   -encourage low-calorie diet  -order A1c and urine microalbumin for next visit           Relevant Medications    metFORMIN (GLUCOPHAGE-XR) 500 MG ER 24hr tablet    tirzepatide (MOUNJARO) 5 mg/0.5 mL PnIj    Other Relevant Orders    Hemoglobin A1C    Microalbumin/Creatinine Ratio, Urine          Follow up in about 2 months (around 6/27/2023) for Diabetes.   -plan specifics discussed above    Orders Placed This Encounter    Hemoglobin A1C    Microalbumin/Creatinine Ratio, Urine    metFORMIN (GLUCOPHAGE-XR) 500 MG ER 24hr tablet    tirzepatide (MOUNJARO) 5 mg/0.5 mL PnIj        Medication List with Changes/Refills   New Medications    TIRZEPATIDE (MOUNJARO) 5 MG/0.5 ML PNIJ    Inject 5 mg into the skin every 7 days.   Current Medications    BLOOD SUGAR DIAGNOSTIC STRP    To check BG one times daily, to use with insurance preferred meter    BLOOD-GLUCOSE METER  KIT    To check BG one times daily, to use with insurance preferred meter    ERGOCALCIFEROL (ERGOCALCIFEROL) 50,000 UNIT CAP    Take 1 capsule (50,000 Units total) by mouth every 7 days.    FLASH GLUCOSE SENSOR (FREESTYLE MIGUEL 14 DAY SENSOR) KIT    1 application by Misc.(Non-Drug; Combo Route) route every 14 (fourteen) days.    FREESTYLE LANCETS 28 GAUGE LANCETS    Apply topically.    LANCETS MISC    To check BG  one times daily, to use with insurance preferred meter    MEDROXYPROGESTERONE (PROVERA) 10 MG TABLET    Take 1 tablet by mouth once daily at 6am. 14 days q month    VALACYCLOVIR (VALTREX) 1000 MG TABLET    Take 1 tablet (1,000 mg total) by mouth every 12 (twelve) hours.   Changed and/or Refilled Medications    Modified Medication Previous Medication    METFORMIN (GLUCOPHAGE-XR) 500 MG ER 24HR TABLET metFORMIN (GLUCOPHAGE-XR) 500 MG ER 24hr tablet       Take 1 tablet (500 mg total) by mouth every evening.    Take 2 tablets (1,000 mg total) by mouth every evening.   Discontinued Medications    TIRZEPATIDE (MOUNJARO) 2.5 MG/0.5 ML PNSAVANNA    Inject 2.5 mg into the skin every 7 days.

## 2023-04-28 NOTE — ASSESSMENT & PLAN NOTE
-Requesting letter for emotional support animal  -discussed anxiety/depression, however patient not interested in pharmacologic intervention at this time    -referred to Psychiatry for formal evaluation and appropriateness of letter

## 2023-04-28 NOTE — ASSESSMENT & PLAN NOTE
-previous HGB A1c 8.2 (03/20/2023   -last visit initiated on Mounjaro 2.5 mg and currently tolerating, increase to 5mg weekly   -metformin XR 1000 mg nightly, decrease to 500 mg preemptively since increasing Mounjaro   -encourage low-calorie diet  -order A1c and urine microalbumin for next visit

## 2023-05-02 ENCOUNTER — TELEPHONE (OUTPATIENT)
Dept: INTERNAL MEDICINE | Facility: CLINIC | Age: 28
End: 2023-05-02
Payer: COMMERCIAL

## 2023-05-02 NOTE — TELEPHONE ENCOUNTER
----- Message from BEKA Roth sent at 4/27/2023  7:20 PM CDT -----  Please schedule for 2 month follow-up for diabetes with labs

## 2023-05-03 ENCOUNTER — TELEPHONE (OUTPATIENT)
Dept: INTERNAL MEDICINE | Facility: CLINIC | Age: 28
End: 2023-05-03
Payer: COMMERCIAL

## 2023-05-18 ENCOUNTER — TELEPHONE (OUTPATIENT)
Dept: INTERNAL MEDICINE | Facility: CLINIC | Age: 28
End: 2023-05-18
Payer: COMMERCIAL

## 2023-05-18 DIAGNOSIS — E11.65 TYPE 2 DIABETES MELLITUS WITH HYPERGLYCEMIA, WITHOUT LONG-TERM CURRENT USE OF INSULIN: ICD-10-CM

## 2023-05-18 DIAGNOSIS — E66.01 MORBID OBESITY DUE TO EXCESS CALORIES: ICD-10-CM

## 2023-05-18 RX ORDER — TIRZEPATIDE 5 MG/.5ML
5 INJECTION, SOLUTION SUBCUTANEOUS
Qty: 4 PEN | Refills: 0 | Status: SHIPPED | OUTPATIENT
Start: 2023-05-18 | End: 2023-06-17

## 2023-05-18 NOTE — TELEPHONE ENCOUNTER
Spoke with pt medication sent over to [facility] pharmacy due to previous pharmacy giving her a higher co-pay, pt. will return call if high copay at [facility] pharmacy as well, so that we may start a PA if necessary.   20

## 2023-05-18 NOTE — TELEPHONE ENCOUNTER
----- Message from Kasia Diaz sent at 5/18/2023 12:03 PM CDT -----  Regarding: advice  Type:  Needs Medical Advice    Who Called: pt  Symptoms (please be specific):    How long has patient had these symptoms:    Pharmacy name and phone #:    Would the patient rather a call back or a response via [facility]?   Best Call Back Number: [phone]  Additional Information: pt called bout having trouble getting meds, she would like for it sent [facility] pharmacy. Please advise

## 2023-05-26 ENCOUNTER — TELEPHONE (OUTPATIENT)
Dept: INTERNAL MEDICINE | Facility: CLINIC | Age: 28
End: 2023-05-26
Payer: COMMERCIAL

## 2023-05-26 NOTE — TELEPHONE ENCOUNTER
Looks like the medication was previously approved in April. Called patient to get more information, no answer- LVM.

## 2023-05-26 NOTE — TELEPHONE ENCOUNTER
----- Message from Rita Fonseca sent at 5/25/2023  2:03 PM CDT -----  .Type:  Needs Medical Advice    Who Called: pt  Symptoms (please be specific):    How long has patient had these symptoms:    Pharmacy name and phone #:    Would the patient rather a call back or a response via [facility]? cb  Best Call Back Number: [phone]  Additional Information: tirzepatide (MOUNJARO) 5 mg/0.5 mL Sunday   needs PA

## 2023-06-20 ENCOUNTER — PATIENT MESSAGE (OUTPATIENT)
Dept: RESEARCH | Facility: HOSPITAL | Age: 28
End: 2023-06-20
Payer: COMMERCIAL

## 2023-06-27 ENCOUNTER — TELEPHONE (OUTPATIENT)
Dept: INTERNAL MEDICINE | Facility: CLINIC | Age: 28
End: 2023-06-27
Payer: COMMERCIAL

## 2023-06-27 ENCOUNTER — PATIENT MESSAGE (OUTPATIENT)
Dept: RESEARCH | Facility: HOSPITAL | Age: 28
End: 2023-06-27
Payer: COMMERCIAL

## 2023-06-27 NOTE — TELEPHONE ENCOUNTER
Patient can not afford medication. She will keep appointment and discuss alternatives at appointment

## 2023-06-27 NOTE — TELEPHONE ENCOUNTER
----- Message from Paola Hannah sent at 6/27/2023  3:53 PM CDT -----  Regarding: wants to talk about meds and appt  Call patient.  Patient wants to discuss meds and see if she still needs to keep appt on 7/6.

## 2023-07-03 PROBLEM — Z13.5 DIABETIC RETINOPATHY SCREENING: Status: RESOLVED | Noted: 2023-03-31 | Resolved: 2023-07-03

## 2023-07-06 ENCOUNTER — OFFICE VISIT (OUTPATIENT)
Dept: INTERNAL MEDICINE | Facility: CLINIC | Age: 28
End: 2023-07-06
Payer: COMMERCIAL

## 2023-07-06 VITALS — WEIGHT: 293 LBS | SYSTOLIC BLOOD PRESSURE: 138 MMHG | BODY MASS INDEX: 46.11 KG/M2 | DIASTOLIC BLOOD PRESSURE: 86 MMHG

## 2023-07-06 DIAGNOSIS — E11.65 TYPE 2 DIABETES MELLITUS WITH HYPERGLYCEMIA, WITHOUT LONG-TERM CURRENT USE OF INSULIN: Primary | ICD-10-CM

## 2023-07-06 DIAGNOSIS — R30.0 DYSURIA: ICD-10-CM

## 2023-07-06 PROCEDURE — 99214 OFFICE O/P EST MOD 30 MIN: CPT | Mod: 95,,,

## 2023-07-06 PROCEDURE — 1159F MED LIST DOCD IN RCRD: CPT | Mod: CPTII,95,,

## 2023-07-06 PROCEDURE — 1160F RVW MEDS BY RX/DR IN RCRD: CPT | Mod: CPTII,95,,

## 2023-07-06 PROCEDURE — 1160F PR REVIEW ALL MEDS BY PRESCRIBER/CLIN PHARMACIST DOCUMENTED: ICD-10-PCS | Mod: CPTII,95,,

## 2023-07-06 PROCEDURE — 3008F BODY MASS INDEX DOCD: CPT | Mod: CPTII,95,,

## 2023-07-06 PROCEDURE — 99214 PR OFFICE/OUTPT VISIT, EST, LEVL IV, 30-39 MIN: ICD-10-PCS | Mod: 95,,,

## 2023-07-06 PROCEDURE — 3075F PR MOST RECENT SYSTOLIC BLOOD PRESS GE 130-139MM HG: ICD-10-PCS | Mod: CPTII,95,,

## 2023-07-06 PROCEDURE — 1159F PR MEDICATION LIST DOCUMENTED IN MEDICAL RECORD: ICD-10-PCS | Mod: CPTII,95,,

## 2023-07-06 PROCEDURE — 3075F SYST BP GE 130 - 139MM HG: CPT | Mod: CPTII,95,,

## 2023-07-06 PROCEDURE — 3079F DIAST BP 80-89 MM HG: CPT | Mod: CPTII,95,,

## 2023-07-06 PROCEDURE — 3008F PR BODY MASS INDEX (BMI) DOCUMENTED: ICD-10-PCS | Mod: CPTII,95,,

## 2023-07-06 PROCEDURE — 3079F PR MOST RECENT DIASTOLIC BLOOD PRESSURE 80-89 MM HG: ICD-10-PCS | Mod: CPTII,95,,

## 2023-07-06 RX ORDER — NITROFURANTOIN 25; 75 MG/1; MG/1
100 CAPSULE ORAL 2 TIMES DAILY
Qty: 14 CAPSULE | Refills: 0 | Status: SHIPPED | OUTPATIENT
Start: 2023-07-07 | End: 2023-07-10 | Stop reason: HOSPADM

## 2023-07-06 RX ORDER — SEMAGLUTIDE 0.68 MG/ML
0.25 INJECTION, SOLUTION SUBCUTANEOUS
Qty: 1.5 ML | Refills: 2 | Status: SHIPPED | OUTPATIENT
Start: 2023-07-06 | End: 2023-10-04

## 2023-07-06 NOTE — PROGRESS NOTES
Patient ID: Joana Gallo is a 28 y.o. female.    Chief Complaint: Diabetes (Follow up, medication concerns mounjaro, vaginal itch/ discomfort )      The patient location is: Home  Visit type: audiovisual    Face to Face time with patient: 25 minutes    35 minutes of total time spent on the encounter, which includes face to face time and non-face to face time preparing to see the patient (eg, review of tests), Obtaining and/or reviewing separately obtained history, Documenting clinical information in the electronic or other health record, Independently interpreting results (not separately reported) and communicating results to the patient/family/caregiver, or Care coordination (not separately reported).     Each patient to whom he or she provides medical services by telemedicine is:  (1) informed of the relationship between the physician and patient and the respective role of any other health care provider with respect to management of the patient; and (2) notified that he or she may decline to receive medical services by telemedicine and may withdraw from such care at any time.      28-year-old female seen today via telemedicine for diabetes follow-up visit.  Medical comorbidities include dm 2, PCOS, morbid obesity, vitamin-D insufficiency, and genital herpes not needing chronic suppression.  Last A1c of 8.2 March 2023, however was failed to obtain up-to-date A1c.  Reports understanding of needing to complete A1c and microalbumin.  Currently on Mounjaro 5 mg metformin XR 1000 mg nightly, however reports unable to feel Mounjaro due to high co-pay amount.  Discussion with patient, will attempt to switch to Ozempic 0.25 mg, however verbalized understanding of need to call office if lack of coverage as well.  Other symptoms include dysuria.  Onset of symptoms within last 7 days.  Associated symptoms include urinary burning and frequency.  No hematuria, fevers, chills, pelvic or flank pain.  Otherwise patient  fairly stable with no other acute medical concerns noted.       Wellness:9/21/22       MEDICAL HISTORY:    Past Medical History:   Diagnosis Date    Allergy     Depression     Genital herpes     Morbid obesity due to excess calories     PCOS (polycystic ovarian syndrome)       History reviewed. No pertinent surgical history.   Social History     Tobacco Use    Smoking status: Never    Smokeless tobacco: Never   Substance Use Topics    Alcohol use: Yes     Comment: once a month    Drug use: Never          Health Maintenance Due   Topic Date Due    Hepatitis C Screening  Never done    Diabetes Urine Screening  Never done    Pneumococcal Vaccines (Age 0-64) (1 - PCV) Never done    Foot Exam  Never done    Hemoglobin A1c  06/29/2023          Patient Care Team:  Paz Vega MD as PCP - General (Internal Medicine)  Shashank Blake OD (Optometry)      Review of Systems   Constitutional:  Negative for fatigue and fever.   HENT:  Negative for congestion, rhinorrhea, sore throat and trouble swallowing.    Eyes:  Negative for redness and visual disturbance.   Respiratory:  Negative for cough, chest tightness and shortness of breath.    Cardiovascular:  Negative for chest pain and palpitations.   Gastrointestinal:  Negative for abdominal pain, constipation, diarrhea, nausea and vomiting.   Genitourinary:  Positive for dysuria and frequency. Negative for flank pain and urgency.   Musculoskeletal:  Negative for arthralgias, gait problem and myalgias.   Skin:  Negative for rash and wound.   Neurological:  Negative for facial asymmetry, speech difficulty, weakness and headaches.   All other systems reviewed and are negative.    Objective:   /86   Wt (!) 154.2 kg (340 lb)   LMP 06/10/2023   BMI 46.11 kg/m²      Physical Exam      **No physical exam completed due to telemedicine format    Assessment:       ICD-10-CM ICD-9-CM   1. Type 2 diabetes mellitus with hyperglycemia, without long-term current use of insulin   E11.65 250.00     790.29   2. Dysuria  R30.0 788.1        Plan:     Problem List Items Addressed This Visit          Renal/    Dysuria     -order UA with reflex to culture   -order Macrobid 100 mg b.i.d., instructed not to take until after urine specimen obtain   -increase fluid hydration  -okay to utilize OTC azo         Relevant Medications    nitrofurantoin, macrocrystal-monohydrate, (MACROBID) 100 MG capsule (Start on 7/7/2023)    Other Relevant Orders    Urinalysis, Reflex to Urine Culture       Endocrine    Type 2 diabetes mellitus with hyperglycemia, without long-term current use of insulin - Primary (Chronic)     -unable to complete A1c prior to visit, encouraged to obtain promptly   -also encouraged to complete urine microalbumin  -previously initiated on Mounjaro 5 mg however unable to financially afford co-pay, switch to Ozempic 0.25 mg weekly  -continue metformin XR 1000 mg nightly, continue         Relevant Medications    semaglutide (OZEMPIC) 0.25 mg or 0.5 mg (2 mg/3 mL) pen injector          Follow up for Previously scheduled and PRN if need.   -plan specifics discussed above    Orders Placed This Encounter    Urinalysis, Reflex to Urine Culture    nitrofurantoin, macrocrystal-monohydrate, (MACROBID) 100 MG capsule    semaglutide (OZEMPIC) 0.25 mg or 0.5 mg (2 mg/3 mL) pen injector        Medication List with Changes/Refills   New Medications    NITROFURANTOIN, MACROCRYSTAL-MONOHYDRATE, (MACROBID) 100 MG CAPSULE    Take 1 capsule (100 mg total) by mouth 2 (two) times daily. for 7 days    SEMAGLUTIDE (OZEMPIC) 0.25 MG OR 0.5 MG (2 MG/3 ML) PEN INJECTOR    Inject 0.25 mg into the skin every 7 days.   Current Medications    BLOOD SUGAR DIAGNOSTIC STRP    To check BG one times daily, to use with insurance preferred meter    BLOOD-GLUCOSE METER KIT    To check BG one times daily, to use with insurance preferred meter    FLASH GLUCOSE SENSOR (FREESTYLE MIGUEL 14 DAY SENSOR) KIT    1 application by  Misc.(Non-Drug; Combo Route) route every 14 (fourteen) days.    FREESTYLE LANCETS 28 GAUGE LANCETS    Apply topically.    LANCETS MISC    To check BG  one times daily, to use with insurance preferred meter    MEDROXYPROGESTERONE (PROVERA) 10 MG TABLET    Take 1 tablet by mouth once daily at 6am. 14 days q month    METFORMIN (GLUCOPHAGE-XR) 500 MG ER 24HR TABLET    Take 1 tablet (500 mg total) by mouth every evening.    VALACYCLOVIR (VALTREX) 1000 MG TABLET    Take 1 tablet (1,000 mg total) by mouth every 12 (twelve) hours.

## 2023-07-06 NOTE — ASSESSMENT & PLAN NOTE
-order UA with reflex to culture   -order Macrobid 100 mg b.i.d., instructed not to take until after urine specimen obtain   -increase fluid hydration  -okay to utilize OTC azo

## 2023-07-06 NOTE — ASSESSMENT & PLAN NOTE
-unable to complete A1c prior to visit, encouraged to obtain promptly   -also encouraged to complete urine microalbumin  -previously initiated on Mounjaro 5 mg however unable to financially afford co-pay, switch to Ozempic 0.25 mg weekly  -continue metformin XR 1000 mg nightly, continue

## 2023-07-07 ENCOUNTER — LAB VISIT (OUTPATIENT)
Dept: LAB | Facility: HOSPITAL | Age: 28
End: 2023-07-07
Payer: COMMERCIAL

## 2023-07-07 DIAGNOSIS — R30.0 DYSURIA: ICD-10-CM

## 2023-07-07 LAB
APPEARANCE UR: ABNORMAL
BACTERIA #/AREA URNS AUTO: ABNORMAL /HPF
BILIRUB UR QL STRIP.AUTO: NEGATIVE MG/DL
COLOR UR: YELLOW
GLUCOSE UR QL STRIP.AUTO: ABNORMAL MG/DL
KETONES UR QL STRIP.AUTO: NEGATIVE MG/DL
LEUKOCYTE ESTERASE UR QL STRIP.AUTO: ABNORMAL UNIT/L
NITRITE UR QL STRIP.AUTO: NEGATIVE
PH UR STRIP.AUTO: 5.5 [PH]
PROT UR QL STRIP.AUTO: ABNORMAL MG/DL
RBC #/AREA URNS AUTO: <5 /HPF
RBC UR QL AUTO: NEGATIVE UNIT/L
SP GR UR STRIP.AUTO: 1.02 (ref 1–1.03)
SQUAMOUS #/AREA URNS AUTO: 7 /HPF
UROBILINOGEN UR STRIP-ACNC: 0.2 MG/DL
WBC #/AREA URNS AUTO: 29 /HPF

## 2023-07-07 PROCEDURE — 81001 URINALYSIS AUTO W/SCOPE: CPT

## 2023-07-07 PROCEDURE — 87088 URINE BACTERIA CULTURE: CPT

## 2023-07-09 LAB — BACTERIA UR CULT: ABNORMAL

## 2023-07-10 ENCOUNTER — PATIENT MESSAGE (OUTPATIENT)
Dept: INTERNAL MEDICINE | Facility: CLINIC | Age: 28
End: 2023-07-10
Payer: COMMERCIAL

## 2023-07-10 DIAGNOSIS — N30.00 ACUTE CYSTITIS WITHOUT HEMATURIA: Primary | ICD-10-CM

## 2023-07-10 RX ORDER — CEFDINIR 300 MG/1
300 CAPSULE ORAL 2 TIMES DAILY
Qty: 14 CAPSULE | Refills: 0 | Status: SHIPPED | OUTPATIENT
Start: 2023-07-10 | End: 2023-07-17

## 2023-07-10 NOTE — PROGRESS NOTES
Most recent urine specimen is positive; continue taking previously prescribed antibiotic for now.  Awaiting final urine cultures.  If switch in antibiotic required, office will reach out notify.

## 2023-07-11 ENCOUNTER — PATIENT MESSAGE (OUTPATIENT)
Dept: RESEARCH | Facility: HOSPITAL | Age: 28
End: 2023-07-11
Payer: COMMERCIAL

## 2023-07-19 ENCOUNTER — PATIENT MESSAGE (OUTPATIENT)
Dept: RESEARCH | Facility: HOSPITAL | Age: 28
End: 2023-07-19
Payer: COMMERCIAL

## 2023-07-24 ENCOUNTER — PATIENT MESSAGE (OUTPATIENT)
Dept: RESEARCH | Facility: HOSPITAL | Age: 28
End: 2023-07-24
Payer: COMMERCIAL

## 2023-09-13 DIAGNOSIS — Z00.00 WELLNESS EXAMINATION: ICD-10-CM

## 2023-09-13 DIAGNOSIS — E55.9 VITAMIN D INSUFFICIENCY: ICD-10-CM

## 2023-09-13 DIAGNOSIS — E11.65 TYPE 2 DIABETES MELLITUS WITH HYPERGLYCEMIA, WITHOUT LONG-TERM CURRENT USE OF INSULIN: Primary | ICD-10-CM

## 2023-10-23 DIAGNOSIS — A60.04 HERPES SIMPLEX VULVOVAGINITIS: Primary | ICD-10-CM

## 2023-10-23 RX ORDER — VALACYCLOVIR HYDROCHLORIDE 1 G/1
1000 TABLET, FILM COATED ORAL EVERY 12 HOURS
Qty: 10 TABLET | Refills: 0 | Status: SHIPPED | OUTPATIENT
Start: 2023-10-23 | End: 2023-12-18 | Stop reason: SDUPTHER

## 2023-10-23 NOTE — TELEPHONE ENCOUNTER
----- Message from Kasia Diaz sent at 10/23/2023 12:48 PM CDT -----  Regarding: refill  Type:  RX Refill Request    Who Called: pt  Refill or New Rx:refill  RX Name and Strength:valACYclovir (VALTREX) 1000 MG tablet  How is the patient currently taking it? (ex. 1XDay):  Is this a 30 day or 90 day RX:  [facility] [address]  Local or Mail Order:local  Ordering Provider:mike  Would the patient rather a call back or a response via [facility]?   Best Call Back Number:[phone]  Additional Information: pt called about needing a refill on medication. Please advise

## 2023-11-30 ENCOUNTER — TELEPHONE (OUTPATIENT)
Dept: INTERNAL MEDICINE | Facility: CLINIC | Age: 28
End: 2023-11-30
Payer: COMMERCIAL

## 2023-11-30 DIAGNOSIS — R30.0 DYSURIA: Primary | ICD-10-CM

## 2023-11-30 NOTE — TELEPHONE ENCOUNTER
----- Message from Kristy Man sent at 11/30/2023 10:42 AM CST -----  Regarding: call back  .Type:  Needs Medical Advice    Who Called: apolinar  Would the patient rather a call back or a response via [facility]?   Best Call Back Number: [phone]  Additional Information: pt is requesting to get a orders for std and hiv testing

## 2023-11-30 NOTE — TELEPHONE ENCOUNTER
----- Message from Kristy Man sent at 11/30/2023 10:42 AM CST -----  Regarding: call back  .Type:  Needs Medical Advice    Who Called: apolinar  Would the patient rather a call back or a response via [facility]?   Best Call Back Number: [phone]  Additional Information: pt is requesting to get a orders for std and hiv testing

## 2023-12-04 ENCOUNTER — TELEPHONE (OUTPATIENT)
Dept: INTERNAL MEDICINE | Facility: CLINIC | Age: 28
End: 2023-12-04
Payer: COMMERCIAL

## 2023-12-04 NOTE — TELEPHONE ENCOUNTER
Spoke with pt she is aware the results will be discussed at upcoming appt, and if any meds need to be sent they will be sent at the appt.    Tumor Depth: Less than 6mm from granular layer and no invasion beyond the subcutaneous fat

## 2023-12-04 NOTE — TELEPHONE ENCOUNTER
----- Message from Carlita Senegal sent at 12/4/2023  9:44 AM CST -----  .Type:  Needs Medical Advice    Who Called:  pt    Symptoms (please be specific):  no     How long has patient had these symptoms:  no    Pharmacy name and phone #:   [facility] [address]    Would the patient rather a call back? yes  Best Call Back Number:  [phone]    Additional Information:  pt says she is waiting on medication pending her test results from last Thursday

## 2023-12-05 ENCOUNTER — OFFICE VISIT (OUTPATIENT)
Dept: INTERNAL MEDICINE | Facility: CLINIC | Age: 28
End: 2023-12-05
Payer: COMMERCIAL

## 2023-12-05 ENCOUNTER — LAB VISIT (OUTPATIENT)
Dept: LAB | Facility: HOSPITAL | Age: 28
End: 2023-12-05
Attending: INTERNAL MEDICINE
Payer: COMMERCIAL

## 2023-12-05 VITALS
RESPIRATION RATE: 18 BRPM | DIASTOLIC BLOOD PRESSURE: 88 MMHG | WEIGHT: 293 LBS | TEMPERATURE: 99 F | BODY MASS INDEX: 49.91 KG/M2 | HEART RATE: 88 BPM | SYSTOLIC BLOOD PRESSURE: 136 MMHG | OXYGEN SATURATION: 99 %

## 2023-12-05 DIAGNOSIS — Z20.2 STD EXPOSURE: ICD-10-CM

## 2023-12-05 DIAGNOSIS — E55.9 VITAMIN D INSUFFICIENCY: ICD-10-CM

## 2023-12-05 DIAGNOSIS — Z11.3 SCREEN FOR STD (SEXUALLY TRANSMITTED DISEASE): ICD-10-CM

## 2023-12-05 DIAGNOSIS — E28.2 PCOS (POLYCYSTIC OVARIAN SYNDROME): Chronic | ICD-10-CM

## 2023-12-05 DIAGNOSIS — E11.65 TYPE 2 DIABETES MELLITUS WITH HYPERGLYCEMIA, WITHOUT LONG-TERM CURRENT USE OF INSULIN: ICD-10-CM

## 2023-12-05 DIAGNOSIS — Z00.00 WELLNESS EXAMINATION: Primary | ICD-10-CM

## 2023-12-05 DIAGNOSIS — E66.01 MORBID OBESITY DUE TO EXCESS CALORIES: Chronic | ICD-10-CM

## 2023-12-05 DIAGNOSIS — Z00.00 WELLNESS EXAMINATION: ICD-10-CM

## 2023-12-05 DIAGNOSIS — E11.65 TYPE 2 DIABETES MELLITUS WITH HYPERGLYCEMIA, WITHOUT LONG-TERM CURRENT USE OF INSULIN: Chronic | ICD-10-CM

## 2023-12-05 LAB
ALBUMIN SERPL-MCNC: 3.8 G/DL (ref 3.5–5)
ALBUMIN/GLOB SERPL: 1.2 RATIO (ref 1.1–2)
ALP SERPL-CCNC: 48 UNIT/L (ref 40–150)
ALT SERPL-CCNC: 14 UNIT/L (ref 0–55)
AST SERPL-CCNC: 16 UNIT/L (ref 5–34)
BASOPHILS # BLD AUTO: 0.05 X10(3)/MCL
BASOPHILS NFR BLD AUTO: 0.7 %
BILIRUB SERPL-MCNC: 0.8 MG/DL
BUN SERPL-MCNC: 10.9 MG/DL (ref 7–18.7)
CALCIUM SERPL-MCNC: 9.3 MG/DL (ref 8.4–10.2)
CHLORIDE SERPL-SCNC: 108 MMOL/L (ref 98–107)
CHOLEST SERPL-MCNC: 135 MG/DL
CHOLEST/HDLC SERPL: 4 {RATIO} (ref 0–5)
CO2 SERPL-SCNC: 24 MMOL/L (ref 22–29)
CREAT SERPL-MCNC: 0.7 MG/DL (ref 0.55–1.02)
CREAT UR-MCNC: 159.9 MG/DL (ref 45–106)
DEPRECATED CALCIDIOL+CALCIFEROL SERPL-MC: 16.6 NG/ML (ref 30–80)
EOSINOPHIL # BLD AUTO: 0.08 X10(3)/MCL (ref 0–0.9)
EOSINOPHIL NFR BLD AUTO: 1.1 %
ERYTHROCYTE [DISTWIDTH] IN BLOOD BY AUTOMATED COUNT: 13.2 % (ref 11.5–17)
EST. AVERAGE GLUCOSE BLD GHB EST-MCNC: 162.8 MG/DL
GFR SERPLBLD CREATININE-BSD FMLA CKD-EPI: >60 MLS/MIN/1.73/M2
GLOBULIN SER-MCNC: 3.2 GM/DL (ref 2.4–3.5)
GLUCOSE SERPL-MCNC: 203 MG/DL (ref 74–100)
HBA1C MFR BLD: 7.3 %
HCT VFR BLD AUTO: 42.4 % (ref 37–47)
HDLC SERPL-MCNC: 35 MG/DL (ref 35–60)
HGB BLD-MCNC: 12.7 G/DL (ref 12–16)
IMM GRANULOCYTES # BLD AUTO: 0.03 X10(3)/MCL (ref 0–0.04)
IMM GRANULOCYTES NFR BLD AUTO: 0.4 %
LDLC SERPL CALC-MCNC: 76 MG/DL (ref 50–140)
LYMPHOCYTES # BLD AUTO: 2.24 X10(3)/MCL (ref 0.6–4.6)
LYMPHOCYTES NFR BLD AUTO: 31.2 %
MCH RBC QN AUTO: 28.7 PG (ref 27–31)
MCHC RBC AUTO-ENTMCNC: 30 G/DL (ref 33–36)
MCV RBC AUTO: 95.7 FL (ref 80–94)
MICROALBUMIN UR-MCNC: 109.5 UG/ML
MICROALBUMIN/CREAT RATIO PNL UR: 68.5 MG/GM CR (ref 0–30)
MONOCYTES # BLD AUTO: 0.45 X10(3)/MCL (ref 0.1–1.3)
MONOCYTES NFR BLD AUTO: 6.3 %
NEUTROPHILS # BLD AUTO: 4.32 X10(3)/MCL (ref 2.1–9.2)
NEUTROPHILS NFR BLD AUTO: 60.3 %
NRBC BLD AUTO-RTO: 0 %
PLATELET # BLD AUTO: 231 X10(3)/MCL (ref 130–400)
PMV BLD AUTO: 10.8 FL (ref 7.4–10.4)
POTASSIUM SERPL-SCNC: 4.3 MMOL/L (ref 3.5–5.1)
PROT SERPL-MCNC: 7 GM/DL (ref 6.4–8.3)
RBC # BLD AUTO: 4.43 X10(6)/MCL (ref 4.2–5.4)
SODIUM SERPL-SCNC: 141 MMOL/L (ref 136–145)
TRIGL SERPL-MCNC: 118 MG/DL (ref 37–140)
TSH SERPL-ACNC: 3.34 UIU/ML (ref 0.35–4.94)
VLDLC SERPL CALC-MCNC: 24 MG/DL
WBC # SPEC AUTO: 7.17 X10(3)/MCL (ref 4.5–11.5)

## 2023-12-05 PROCEDURE — 36415 COLL VENOUS BLD VENIPUNCTURE: CPT

## 2023-12-05 PROCEDURE — 84443 ASSAY THYROID STIM HORMONE: CPT

## 2023-12-05 PROCEDURE — 3060F PR POS MICROALBUMINURIA RESULT DOCUMENTED/REVIEW: ICD-10-PCS | Mod: CPTII,,,

## 2023-12-05 PROCEDURE — 3051F PR MOST RECENT HEMOGLOBIN A1C LEVEL 7.0 - < 8.0%: ICD-10-PCS | Mod: CPTII,,,

## 2023-12-05 PROCEDURE — 83036 HEMOGLOBIN GLYCOSYLATED A1C: CPT

## 2023-12-05 PROCEDURE — 3079F DIAST BP 80-89 MM HG: CPT | Mod: CPTII,,,

## 2023-12-05 PROCEDURE — 99395 PREV VISIT EST AGE 18-39: CPT | Mod: ,,,

## 2023-12-05 PROCEDURE — 3079F PR MOST RECENT DIASTOLIC BLOOD PRESSURE 80-89 MM HG: ICD-10-PCS | Mod: CPTII,,,

## 2023-12-05 PROCEDURE — 3008F PR BODY MASS INDEX (BMI) DOCUMENTED: ICD-10-PCS | Mod: CPTII,,,

## 2023-12-05 PROCEDURE — 85025 COMPLETE CBC W/AUTO DIFF WBC: CPT

## 2023-12-05 PROCEDURE — 3066F NEPHROPATHY DOC TX: CPT | Mod: CPTII,,,

## 2023-12-05 PROCEDURE — 3075F SYST BP GE 130 - 139MM HG: CPT | Mod: CPTII,,,

## 2023-12-05 PROCEDURE — 3066F PR DOCUMENTATION OF TREATMENT FOR NEPHROPATHY: ICD-10-PCS | Mod: CPTII,,,

## 2023-12-05 PROCEDURE — 80061 LIPID PANEL: CPT

## 2023-12-05 PROCEDURE — 1159F MED LIST DOCD IN RCRD: CPT | Mod: CPTII,,,

## 2023-12-05 PROCEDURE — 3008F BODY MASS INDEX DOCD: CPT | Mod: CPTII,,,

## 2023-12-05 PROCEDURE — 3051F HG A1C>EQUAL 7.0%<8.0%: CPT | Mod: CPTII,,,

## 2023-12-05 PROCEDURE — 99395 PR PREVENTIVE VISIT,EST,18-39: ICD-10-PCS | Mod: ,,,

## 2023-12-05 PROCEDURE — 80053 COMPREHEN METABOLIC PANEL: CPT

## 2023-12-05 PROCEDURE — 82043 UR ALBUMIN QUANTITATIVE: CPT

## 2023-12-05 PROCEDURE — 1160F PR REVIEW ALL MEDS BY PRESCRIBER/CLIN PHARMACIST DOCUMENTED: ICD-10-PCS | Mod: CPTII,,,

## 2023-12-05 PROCEDURE — 82306 VITAMIN D 25 HYDROXY: CPT

## 2023-12-05 PROCEDURE — 3075F PR MOST RECENT SYSTOLIC BLOOD PRESS GE 130-139MM HG: ICD-10-PCS | Mod: CPTII,,,

## 2023-12-05 PROCEDURE — 1160F RVW MEDS BY RX/DR IN RCRD: CPT | Mod: CPTII,,,

## 2023-12-05 PROCEDURE — 1159F PR MEDICATION LIST DOCUMENTED IN MEDICAL RECORD: ICD-10-PCS | Mod: CPTII,,,

## 2023-12-05 PROCEDURE — 3060F POS MICROALBUMINURIA REV: CPT | Mod: CPTII,,,

## 2023-12-05 RX ORDER — ERGOCALCIFEROL 1.25 MG/1
50000 CAPSULE ORAL
COMMUNITY
Start: 2023-09-17 | End: 2023-12-05 | Stop reason: SDUPTHER

## 2023-12-05 RX ORDER — ERGOCALCIFEROL 1.25 MG/1
50000 CAPSULE ORAL
Qty: 24 CAPSULE | Refills: 0 | Status: SHIPPED | OUTPATIENT
Start: 2023-12-07 | End: 2024-03-06

## 2023-12-05 RX ORDER — SEMAGLUTIDE 1.34 MG/ML
1 INJECTION, SOLUTION SUBCUTANEOUS
Qty: 3 ML | Refills: 2 | Status: SHIPPED | OUTPATIENT
Start: 2023-12-05 | End: 2023-12-18

## 2023-12-05 RX ORDER — SEMAGLUTIDE 0.68 MG/ML
INJECTION, SOLUTION SUBCUTANEOUS
COMMUNITY
Start: 2023-10-26 | End: 2023-12-05

## 2023-12-05 NOTE — PROGRESS NOTES
Patient ID: Joana Gallo is a 28 y.o. female.    Chief Complaint: Follow-up (Pt states she would like STD testing,also has concerns with sensitivity to cold )    28-year-old female seen today for diabetes follow-up visit.  Medical comorbidities include DM 2, PCOS, morbid obesity, vitamin-D insufficiency, and genital herpes not needing chronic suppression.  Most recent HGB A1c 7.3, improved from prior A1c of 8.2 March 2023, currently on Ozempic 0.5 mg and metformin  mg nightly.  Vitamin-D level 16, improved from prior 7.  Reports compliance currently on vitamin-D 80360 units once weekly.  Otherwise patient fairly stable with no other acute medical concerns noted.       Wellness:  12/05/2023             MEDICAL HISTORY:    Past Medical History:   Diagnosis Date    Allergy     Depression     Genital herpes     Irritable bowel syndrome, unspecified type     Morbid obesity due to excess calories     PCOS (polycystic ovarian syndrome)     Type 2 diabetes mellitus with hyperglycemia, without long-term current use of insulin     Vitamin D insufficiency       History reviewed. No pertinent surgical history.   Social History     Tobacco Use    Smoking status: Never    Smokeless tobacco: Never   Substance Use Topics    Alcohol use: Yes     Comment: once a month    Drug use: Never          Health Maintenance Due   Topic Date Due    Hepatitis C Screening  Never done    Pneumococcal Vaccines (Age 0-64) (1 - PCV) Never done    Foot Exam  Never done    TETANUS VACCINE  Never done    Influenza Vaccine (1) Never done    COVID-19 Vaccine (3 - 2023-24 season) 09/01/2023    Eye Exam  11/25/2023          Patient Care Team:  Paz Vega MD as PCP - General (Internal Medicine)  Shashank Blake OD (Optometry)      Review of Systems   Constitutional:  Negative for fatigue and fever.   HENT:  Negative for congestion, rhinorrhea, sore throat and trouble swallowing.    Eyes:  Negative for redness and visual disturbance.    Respiratory:  Negative for cough, chest tightness and shortness of breath.    Cardiovascular:  Negative for chest pain and palpitations.   Gastrointestinal:  Negative for abdominal pain, constipation, diarrhea, nausea and vomiting.   Endocrine: Negative for polydipsia, polyphagia and polyuria.   Genitourinary:  Negative for dysuria, flank pain, frequency and urgency.   Musculoskeletal:  Negative for arthralgias, gait problem and myalgias.   Skin:  Negative for rash and wound.   Neurological:  Negative for facial asymmetry, speech difficulty, weakness and headaches.   All other systems reviewed and are negative.      Objective:   /88 (BP Location: Left arm, Patient Position: Sitting, BP Method: Large (Automatic))   Pulse 88   Temp 99 °F (37.2 °C) (Temporal)   Resp 18   Wt (!) 166.9 kg (368 lb)   SpO2 99%   BMI 49.91 kg/m²      Physical Exam  Constitutional:       General: She is not in acute distress.     Appearance: Normal appearance. She is obese.   HENT:      Right Ear: Tympanic membrane, ear canal and external ear normal.      Left Ear: Tympanic membrane, ear canal and external ear normal.      Nose: Nose normal.      Mouth/Throat:      Mouth: Mucous membranes are moist.      Pharynx: Oropharynx is clear.   Eyes:      Extraocular Movements: Extraocular movements intact.      Conjunctiva/sclera: Conjunctivae normal.      Pupils: Pupils are equal, round, and reactive to light.   Cardiovascular:      Rate and Rhythm: Normal rate and regular rhythm.      Pulses: Normal pulses.      Heart sounds: Normal heart sounds. No murmur heard.     No gallop.   Pulmonary:      Effort: Pulmonary effort is normal.      Breath sounds: Normal breath sounds. No wheezing.   Abdominal:      General: Bowel sounds are normal. There is no distension.      Palpations: Abdomen is soft. There is no mass.      Tenderness: There is no abdominal tenderness. There is no guarding.   Musculoskeletal:         General: Normal range of  motion.      Right foot: Normal range of motion. No deformity or foot drop.      Left foot: Normal range of motion. No deformity or foot drop.   Feet:      Right foot:      Protective Sensation: 3 sites tested.  3 sites sensed.      Skin integrity: Skin integrity normal. No ulcer, skin breakdown or erythema.      Toenail Condition: Right toenails are normal.      Left foot:      Protective Sensation: 3 sites tested.  3 sites sensed.      Skin integrity: Skin integrity normal. No ulcer, skin breakdown or erythema.      Toenail Condition: Left toenails are normal.   Skin:     General: Skin is warm and dry.   Neurological:      Mental Status: She is alert. Mental status is at baseline.      Sensory: No sensory deficit.      Motor: No weakness.           Assessment:       ICD-10-CM ICD-9-CM   1. Wellness examination  Z00.00 V70.0   2. Type 2 diabetes mellitus with hyperglycemia, without long-term current use of insulin  E11.65 250.00     790.29   3. Morbid obesity due to excess calories  E66.01 278.01   4. PCOS (polycystic ovarian syndrome)  E28.2 256.4   5. Vitamin D insufficiency  E55.9 268.9   6. Screen for STD (sexually transmitted disease)  Z11.3 V74.5   7. STD exposure  Z20.2 V01.6        Plan:     Problem List Items Addressed This Visit          ID    STD exposure     -requesting screening , encouraged safe sex practices  -STD panel placed         Relevant Orders    HIV 1/2 Ag/Ab (4th Gen)    Hepatitis panel, acute    Chlamydia/GC, PCR    Hepatitis Panel, Acute    HIV 1/2 Ag/Ab (4th Gen)    SYPHILIS ANTIBODY (WITH REFLEX RPR)    Trichomonas Prep Wet Mount       Endocrine    PCOS (polycystic ovarian syndrome) (Chronic)     -currently on metformin 1000 mg nightly, decrease to 500 mg nightly since increasing Ozempic  -encourage weight loss   -currently on birth control, continue         Morbid obesity due to excess calories (Chronic)     -currently on Ozempic 0.5 mg, increasing to 1 mg   -encourage low-calorie low  carb diet   -encourage some form of routine aerobic exercise for weight loss         Type 2 diabetes mellitus with hyperglycemia, without long-term current use of insulin (Chronic)     Lab Results   Component Value Date    HGBA1C 7.3 (H) 12/05/2023   -improved from prior HGB A1c of 8   -currently on Ozempic 0.5 mg and metformin XR 1000 mg nightly, decrease metformin to 500 mg nightly and increase Ozempic to 1 mg   -low-carbohydrate diet   -HGB A1c for next visit           Relevant Medications    semaglutide (OZEMPIC) 1 mg/dose (4 mg/3 mL)    Other Relevant Orders    Hemoglobin A1C    Vitamin D insufficiency    Relevant Medications    ergocalciferol (ERGOCALCIFEROL) 50,000 unit Cap (Start on 12/7/2023)       Other    Wellness examination - Primary     -patient feeling generally well today  -wellness labs reviewed and generally stable, elevated A1c  -age-appropriate screenings up-to-date  -immunizations discussed, declined influenza vaccine  -encourage routine aerobic exercise 2 to 3 times a week  -increase fluid hydration            Other Visit Diagnoses       Screen for STD (sexually transmitted disease)        Relevant Orders    HIV 1/2 Ag/Ab (4th Gen)    Hepatitis panel, acute    Chlamydia/GC, PCR    Hepatitis Panel, Acute    HIV 1/2 Ag/Ab (4th Gen)    SYPHILIS ANTIBODY (WITH REFLEX RPR)    Trichomonas Prep Wet Mount               Follow up in about 4 months (around 4/5/2024) for Diabetes, with labs prior.   -plan specifics discussed above    Orders Placed This Encounter    Chlamydia/GC, PCR    Trichomonas Prep Wet Mount    HIV 1/2 Ag/Ab (4th Gen)    Hepatitis panel, acute    Hepatitis Panel, Acute    HIV 1/2 Ag/Ab (4th Gen)    SYPHILIS ANTIBODY (WITH REFLEX RPR)    Hemoglobin A1C    semaglutide (OZEMPIC) 1 mg/dose (4 mg/3 mL)    ergocalciferol (ERGOCALCIFEROL) 50,000 unit Cap        Medication List with Changes/Refills   New Medications    SEMAGLUTIDE (OZEMPIC) 1 MG/DOSE (4 MG/3 ML)    Inject 1 mg into the skin  every 7 days.   Current Medications    BLOOD SUGAR DIAGNOSTIC STRP    To check BG one times daily, to use with insurance preferred meter    BLOOD-GLUCOSE METER KIT    To check BG one times daily, to use with insurance preferred meter    FREESTYLE LANCETS 28 GAUGE LANCETS    Apply topically.    LANCETS MISC    To check BG  one times daily, to use with insurance preferred meter    MEDROXYPROGESTERONE (PROVERA) 10 MG TABLET    Take 1 tablet by mouth once daily at 6am. 14 days q month    METFORMIN (GLUCOPHAGE-XR) 500 MG ER 24HR TABLET    Take 1 tablet (500 mg total) by mouth every evening.    VALACYCLOVIR (VALTREX) 1000 MG TABLET    Take 1 tablet (1,000 mg total) by mouth every 12 (twelve) hours.   Changed and/or Refilled Medications    Modified Medication Previous Medication    ERGOCALCIFEROL (ERGOCALCIFEROL) 50,000 UNIT CAP ergocalciferol (ERGOCALCIFEROL) 50,000 unit Cap       Take 1 capsule (50,000 Units total) by mouth twice a week.    Take 50,000 Units by mouth every 7 days.   Discontinued Medications    OZEMPIC 0.25 MG OR 0.5 MG (2 MG/3 ML) PEN INJECTOR    SMARTSI.25 Milligram(s) SUB-Q Once a Week

## 2023-12-05 NOTE — ASSESSMENT & PLAN NOTE
Lab Results   Component Value Date    HGBA1C 7.3 (H) 12/05/2023   -improved from prior HGB A1c of 8   -currently on Ozempic 0.5 mg and metformin XR 1000 mg nightly, decrease metformin to 500 mg nightly and increase Ozempic to 1 mg   -low-carbohydrate diet   -HGB A1c for next visit

## 2023-12-05 NOTE — ASSESSMENT & PLAN NOTE
-currently on metformin 1000 mg nightly, decrease to 500 mg nightly since increasing Ozempic  -encourage weight loss   -currently on birth control, continue

## 2023-12-05 NOTE — ASSESSMENT & PLAN NOTE
-currently on Ozempic 0.5 mg, increasing to 1 mg   -encourage low-calorie low carb diet   -encourage some form of routine aerobic exercise for weight loss

## 2023-12-05 NOTE — ASSESSMENT & PLAN NOTE
-patient feeling generally well today  -wellness labs reviewed and generally stable, elevated A1c  -age-appropriate screenings up-to-date  -immunizations discussed, declined influenza vaccine  -encourage routine aerobic exercise 2 to 3 times a week  -increase fluid hydration

## 2023-12-18 ENCOUNTER — TELEPHONE (OUTPATIENT)
Dept: INTERNAL MEDICINE | Facility: CLINIC | Age: 28
End: 2023-12-18
Payer: COMMERCIAL

## 2023-12-18 DIAGNOSIS — A60.04 HERPES SIMPLEX VULVOVAGINITIS: ICD-10-CM

## 2023-12-18 DIAGNOSIS — E11.65 TYPE 2 DIABETES MELLITUS WITH HYPERGLYCEMIA, WITHOUT LONG-TERM CURRENT USE OF INSULIN: ICD-10-CM

## 2023-12-18 RX ORDER — METFORMIN HYDROCHLORIDE 500 MG/1
1000 TABLET, EXTENDED RELEASE ORAL NIGHTLY
Qty: 180 TABLET | Refills: 1 | Status: SHIPPED | OUTPATIENT
Start: 2023-12-18 | End: 2024-06-15

## 2023-12-18 RX ORDER — VALACYCLOVIR HYDROCHLORIDE 1 G/1
1000 TABLET, FILM COATED ORAL EVERY 12 HOURS
Qty: 10 TABLET | Refills: 3 | Status: SHIPPED | OUTPATIENT
Start: 2023-12-18 | End: 2024-12-17

## 2023-12-18 RX ORDER — SEMAGLUTIDE 0.68 MG/ML
0.5 INJECTION, SOLUTION SUBCUTANEOUS
Qty: 3 ML | Refills: 5 | Status: SHIPPED | OUTPATIENT
Start: 2023-12-18 | End: 2024-06-15

## 2023-12-18 NOTE — TELEPHONE ENCOUNTER
She said she can not tolerate the increase in Ozempic. She was fine on the lower dose. Did explain to her it was to lower her A!C, she still insists to go back down

## 2023-12-18 NOTE — TELEPHONE ENCOUNTER
----- Message from Phyllis Ham sent at 12/18/2023 11:32 AM CST -----  Regarding: meds  .Type:  RX Refill Request    Who Called: Pt  Refill or New Rx:Refill  RX Name and Strength:valACYclovir (VALTREX) 1000 MG tablet   How is the patient currently taking it? (ex. 1XDay):  Is this a 30 day or 90 day RX:  Preferred Pharm[facility] with phone number[facility] or Mail Order:Local  Ordering Provider:Gary   Would the patient rather a call back or a response via [facility]? Call back  Best Call Back Number: [phone]  Additional Information:Requesting more refills when script is called in.    .Type:  RX Refill Request    Who Called: Pt  Refill or New Rx:Refill  RX Name and Strength:semaglutide (OZEMPIC) 1 mg/dose (4 mg/3 mL)   How is the patient currently taking it? (ex. 1XDay):  Is this a 30 day or 90 day RX:  Preferred Pharmacy with phone number:[facility], [place]  Local or Mail Order:Local  Ordering Provider:Gary   Would the patient rather a call back or a response via [facility]? Call back  Best Call Back Number: [phone]  Additional Information:Wants to be at 0.5 mg again    .Type:  Needs Medical Advice    Who Called: Pt  Symptoms (please be specific):    How long has patient had these symptoms:    Pharmacy name and phone #:    Would the patient rather a call back or a response via [facility]? Call back  Best Call Back Number: [phone]  Additional Information: Needs to talk with nurse on how many and how often she should take metFORMIN (GLUCOPHAGE-XR) 500 MG ER 24hr tablet ..

## 2023-12-18 NOTE — TELEPHONE ENCOUNTER
Noted.  0.5 mg Ozempic dose sent to her pharmacy, metformin extended release a 1000 mg nightly as well as Jardiance 10 mg daily sent to her pharmacy.  Metformin and lower dose Ozempic or not sufficient enough to control her diabetes.

## 2024-03-11 PROBLEM — Z00.00 WELLNESS EXAMINATION: Status: RESOLVED | Noted: 2023-12-05 | Resolved: 2024-03-11

## 2024-04-01 ENCOUNTER — TELEPHONE (OUTPATIENT)
Dept: INTERNAL MEDICINE | Facility: CLINIC | Age: 29
End: 2024-04-01
Payer: COMMERCIAL

## 2024-05-15 ENCOUNTER — TELEPHONE (OUTPATIENT)
Dept: PHARMACY | Facility: CLINIC | Age: 29
End: 2024-05-15
Payer: COMMERCIAL

## 2024-06-18 DIAGNOSIS — E11.65 TYPE 2 DIABETES MELLITUS WITH HYPERGLYCEMIA, WITHOUT LONG-TERM CURRENT USE OF INSULIN: Primary | ICD-10-CM

## 2024-06-18 RX ORDER — SEMAGLUTIDE 1.34 MG/ML
1 INJECTION, SOLUTION SUBCUTANEOUS
Qty: 3 ML | Refills: 3 | Status: SHIPPED | OUTPATIENT
Start: 2024-06-18 | End: 2025-06-18

## 2024-09-06 LAB
CHLAMYDIA BY NAA: NEGATIVE
CHLAMYDIA BY NAA: NEGATIVE
HPV APTIMA: NEGATIVE
PAP RECOMMENDATION EXT: NORMAL
PAP SMEAR: NORMAL
TRICHOMONAS VAGINALIS BY NAA: NEGATIVE

## 2024-09-09 ENCOUNTER — TELEPHONE (OUTPATIENT)
Dept: INTERNAL MEDICINE | Facility: CLINIC | Age: 29
End: 2024-09-09
Payer: COMMERCIAL

## 2024-09-09 DIAGNOSIS — E11.65 TYPE 2 DIABETES MELLITUS WITH HYPERGLYCEMIA, WITHOUT LONG-TERM CURRENT USE OF INSULIN: Primary | ICD-10-CM

## 2024-09-09 NOTE — TELEPHONE ENCOUNTER
----- Message from [facility] sent at 9/6/2024  2:13 PM CDT -----  .Caller is requesting to schedule their Lab appointment prior to annual appointment.    Name of Caller: pt      Preferred Date and Time of Labs:  now    Date of [facility] Appointment: 9-13 -24    Where would they like the lab performed? [facility]      Would the patient rather a call back or a response via [facility]?  [place]      Best Call Back Number: [phone]      Additional Information: please put labs in system thanks

## 2024-09-13 ENCOUNTER — OFFICE VISIT (OUTPATIENT)
Dept: INTERNAL MEDICINE | Facility: CLINIC | Age: 29
End: 2024-09-13
Payer: COMMERCIAL

## 2024-09-13 ENCOUNTER — LAB VISIT (OUTPATIENT)
Dept: LAB | Facility: HOSPITAL | Age: 29
End: 2024-09-13
Attending: INTERNAL MEDICINE
Payer: COMMERCIAL

## 2024-09-13 VITALS — BODY MASS INDEX: 39.68 KG/M2 | HEIGHT: 72 IN | WEIGHT: 293 LBS

## 2024-09-13 DIAGNOSIS — R73.09 HEMOGLOBIN A1C GREATER THAN 8.0%: ICD-10-CM

## 2024-09-13 DIAGNOSIS — E66.01 CLASS 3 SEVERE OBESITY DUE TO EXCESS CALORIES WITH SERIOUS COMORBIDITY AND BODY MASS INDEX (BMI) OF 45.0 TO 49.9 IN ADULT: ICD-10-CM

## 2024-09-13 DIAGNOSIS — E28.2 PCOS (POLYCYSTIC OVARIAN SYNDROME): Chronic | ICD-10-CM

## 2024-09-13 DIAGNOSIS — Z91.89 CARDIOVASCULAR RISK FACTOR: ICD-10-CM

## 2024-09-13 DIAGNOSIS — E55.9 VITAMIN D INSUFFICIENCY: ICD-10-CM

## 2024-09-13 DIAGNOSIS — E11.65 UNCONTROLLED DIABETES MELLITUS WITH HYPERGLYCEMIA, WITHOUT LONG-TERM CURRENT USE OF INSULIN: Primary | ICD-10-CM

## 2024-09-13 DIAGNOSIS — E11.65 TYPE 2 DIABETES MELLITUS WITH HYPERGLYCEMIA, WITHOUT LONG-TERM CURRENT USE OF INSULIN: ICD-10-CM

## 2024-09-13 LAB
ALBUMIN SERPL-MCNC: 3.7 G/DL (ref 3.5–5)
ALBUMIN/GLOB SERPL: 1.2 RATIO (ref 1.1–2)
ALP SERPL-CCNC: 49 UNIT/L (ref 40–150)
ALT SERPL-CCNC: 20 UNIT/L (ref 0–55)
ANION GAP SERPL CALC-SCNC: 8 MEQ/L
AST SERPL-CCNC: 20 UNIT/L (ref 5–34)
BILIRUB SERPL-MCNC: 0.3 MG/DL
BUN SERPL-MCNC: 8 MG/DL (ref 7–18.7)
CALCIUM SERPL-MCNC: 9 MG/DL (ref 8.4–10.2)
CHLORIDE SERPL-SCNC: 109 MMOL/L (ref 98–107)
CO2 SERPL-SCNC: 23 MMOL/L (ref 22–29)
CREAT SERPL-MCNC: 0.78 MG/DL (ref 0.55–1.02)
CREAT/UREA NIT SERPL: 10
EST. AVERAGE GLUCOSE BLD GHB EST-MCNC: 208.7 MG/DL
GFR SERPLBLD CREATININE-BSD FMLA CKD-EPI: >60 ML/MIN/1.73/M2
GLOBULIN SER-MCNC: 3.2 GM/DL (ref 2.4–3.5)
GLUCOSE SERPL-MCNC: 262 MG/DL (ref 74–100)
HBA1C MFR BLD: 8.9 %
POTASSIUM SERPL-SCNC: 4.4 MMOL/L (ref 3.5–5.1)
PROT SERPL-MCNC: 6.9 GM/DL (ref 6.4–8.3)
SODIUM SERPL-SCNC: 140 MMOL/L (ref 136–145)

## 2024-09-13 PROCEDURE — 83036 HEMOGLOBIN GLYCOSYLATED A1C: CPT

## 2024-09-13 PROCEDURE — 36415 COLL VENOUS BLD VENIPUNCTURE: CPT

## 2024-09-13 PROCEDURE — 80053 COMPREHEN METABOLIC PANEL: CPT

## 2024-09-13 RX ORDER — TIRZEPATIDE 2.5 MG/.5ML
2.5 INJECTION, SOLUTION SUBCUTANEOUS
Qty: 2 ML | Refills: 0 | Status: SHIPPED | OUTPATIENT
Start: 2024-09-13 | End: 2024-10-13

## 2024-09-13 RX ORDER — ERGOCALCIFEROL 1.25 MG/1
50000 CAPSULE ORAL
Qty: 24 CAPSULE | Refills: 1 | Status: SHIPPED | OUTPATIENT
Start: 2024-09-16 | End: 2025-03-15

## 2024-09-13 RX ORDER — ERGOCALCIFEROL 1.25 MG/1
50000 CAPSULE ORAL
Qty: 12 CAPSULE | Refills: 1 | Status: SHIPPED | OUTPATIENT
Start: 2024-09-13 | End: 2024-09-13

## 2024-09-13 RX ORDER — TIRZEPATIDE 5 MG/.5ML
5 INJECTION, SOLUTION SUBCUTANEOUS
Qty: 6 ML | Refills: 0 | Status: SHIPPED | OUTPATIENT
Start: 2024-10-13 | End: 2025-01-11

## 2024-09-13 NOTE — ASSESSMENT & PLAN NOTE
Lab Results   Component Value Date    HGBA1C 8.9 (H) 09/13/2024     Acute on chronic   -previously on Ozempic 1 mg due to insurance lost stopped taking in March, discontinue   -initiate on Mounjaro 2.5 mg weekly for one-month, then increase to 5 mg weekly thereafter  -also on metformin XR a 1000 mg nightly for PCOS and diabetes, continue   -stressed importance of diabetic diet and medication compliance  -A1c for next visit

## 2024-09-13 NOTE — ASSESSMENT & PLAN NOTE
-currently on metformin 1000 mg nightly, continue  -encourage weight loss   -currently on birth control, continue

## 2024-09-13 NOTE — PROGRESS NOTES
Patient ID: Joana Gallo is a 29 y.o. female.    Chief Complaint: Follow-up (4 month follow up- labs done. No complaints or concerns today. )      The patient location is:  Home  Visit type: audiovisual    Face to Face time with patient:  25 minutes  35 minutes of total time spent on the encounter, which includes face to face time and non-face to face time preparing to see the patient (eg, review of tests), Obtaining and/or reviewing separately obtained history, Documenting clinical information in the electronic or other health record, Independently interpreting results (not separately reported) and communicating results to the patient/family/caregiver, or Care coordination (not separately reported).     Each patient to whom he or she provides medical services by telemedicine is:  (1) informed of the relationship between the physician and patient and the respective role of any other health care provider with respect to management of the patient; and (2) notified that he or she may decline to receive medical services by telemedicine and may withdraw from such care at any time.      Joana Gallo is a 29 y.o. female, known to Dr Vega, presents today for a diabetic follow-up visit.  Medical comorbidities include  DM 2, PCOS, morbid obesity, vitamin-D insufficiency, and genital herpes not needing chronic suppression.  Since last visit patient reports she has been without major illness.  Did have recent labs in medical insurance, thus unable to obtain medications or follow-up.  Most recent HGB A1c 8.9, patient last dose of Ozempic this past March.  Does not check blood sugars routinely, however reports some polydipsia and polyuria.  Not 100% compliance on diabetic diet.  Discussion had with patient regarding diabetic medications, does voice interest in Mounjaro instead of Ozempic.  Requesting refill on vitamin-D prescription as well.  Otherwise denies any other acute medical concerns.      Wellness:  12/05/2023      MEDICAL HISTORY:    Past Medical History:   Diagnosis Date    Allergy     Depression     Genital herpes     Irritable bowel syndrome, unspecified type     Morbid obesity due to excess calories     PCOS (polycystic ovarian syndrome)     Type 2 diabetes mellitus with hyperglycemia, without long-term current use of insulin     Vitamin D insufficiency       History reviewed. No pertinent surgical history.   Social History     Tobacco Use    Smoking status: Never    Smokeless tobacco: Never   Substance Use Topics    Alcohol use: Yes     Comment: once a month    Drug use: Never          Health Maintenance Due   Topic Date Due    Hepatitis C Screening  Never done    Pneumococcal Vaccines (Age 0-64) (1 of 2 - PCV) Never done    Foot Exam  Never done    TETANUS VACCINE  Never done    Eye Exam  11/25/2023    Influenza Vaccine (1) Never done    COVID-19 Vaccine (3 - 2023-24 season) 09/01/2024    Cervical Cancer Screening  09/27/2024          Patient Care Team:  Paz Vega MD as PCP - General (Internal Medicine)  Shashank Blake OD (Optometry)      Review of Systems   Constitutional:  Negative for fatigue and fever.   HENT:  Negative for congestion, rhinorrhea, sore throat and trouble swallowing.    Eyes:  Negative for redness and visual disturbance.   Respiratory:  Negative for cough, chest tightness and shortness of breath.    Cardiovascular:  Negative for chest pain and palpitations.   Gastrointestinal:  Negative for abdominal pain, constipation, diarrhea, nausea and vomiting.   Endocrine: Positive for polydipsia and polyuria. Negative for polyphagia.   Genitourinary:  Negative for dysuria, flank pain, frequency and urgency.   Musculoskeletal:  Negative for arthralgias, gait problem and myalgias.   Skin:  Negative for rash and wound.   Neurological:  Negative for facial asymmetry, speech difficulty, weakness and headaches.   All other systems reviewed and are negative.      Objective:    Ht 6' (1.829 m)   Wt (!) 164.2 kg (362 lb)   LMP 09/10/2024   BMI 49.10 kg/m²      Physical Exam      **No physical exam completed due to telemedicine format    Assessment:       ICD-10-CM ICD-9-CM   1. Uncontrolled diabetes mellitus with hyperglycemia, without long-term current use of insulin  E11.65 250.02   2. Hemoglobin A1c greater than 8.0%  R73.09 790.29   3. Cardiovascular risk factor  Z91.89 V15.89   4. Class 3 severe obesity due to excess calories with serious comorbidity and body mass index (BMI) of 45.0 to 49.9 in adult  E66.01 278.01    Z68.42 V85.42   5. Vitamin D insufficiency  E55.9 268.9   6. PCOS (polycystic ovarian syndrome)  E28.2 256.4        Plan:     Problem List Items Addressed This Visit          Cardiac/Vascular    Cardiovascular risk factor     -considering HGB A1c and weight         Relevant Medications    tirzepatide (MOUNJARO) 5 mg/0.5 mL PnIj (Start on 10/13/2024)    tirzepatide (MOUNJARO) 2.5 mg/0.5 mL PnIj    Other Relevant Orders    TSH    Lipid Panel    Comprehensive Metabolic Panel    CBC Auto Differential    Vitamin D    Microalbumin/Creatinine Ratio, Urine    Hemoglobin A1C       Endocrine    PCOS (polycystic ovarian syndrome) (Chronic)     -currently on metformin 1000 mg nightly, continue  -encourage weight loss   -currently on birth control, continue            Relevant Orders    TSH    Lipid Panel    Comprehensive Metabolic Panel    CBC Auto Differential    Vitamin D    Microalbumin/Creatinine Ratio, Urine    Hemoglobin A1C    Class 3 severe obesity due to excess calories with serious comorbidity and body mass index (BMI) of 45.0 to 49.9 in adult    Relevant Medications    tirzepatide (MOUNJARO) 5 mg/0.5 mL PnIj (Start on 10/13/2024)    tirzepatide (MOUNJARO) 2.5 mg/0.5 mL PnIj    Other Relevant Orders    TSH    Lipid Panel    Comprehensive Metabolic Panel    CBC Auto Differential    Vitamin D    Microalbumin/Creatinine Ratio, Urine    Hemoglobin A1C    Vitamin D  insufficiency     -acute on chronic  -previously on vitamin-D 01633 units twice weekly however no longer taking, re-initiate on  -vitamin-D level for next visit         Relevant Medications    ergocalciferol (ERGOCALCIFEROL) 50,000 unit Cap (Start on 9/16/2024)    Other Relevant Orders    TSH    Lipid Panel    Comprehensive Metabolic Panel    CBC Auto Differential    Vitamin D    Microalbumin/Creatinine Ratio, Urine    Hemoglobin A1C    Uncontrolled diabetes mellitus with hyperglycemia, without long-term current use of insulin - Primary     Lab Results   Component Value Date    HGBA1C 8.9 (H) 09/13/2024     Acute on chronic   -previously on Ozempic 1 mg due to insurance lost stopped taking in March, discontinue   -initiate on Mounjaro 2.5 mg weekly for one-month, then increase to 5 mg weekly thereafter  -also on metformin XR a 1000 mg nightly for PCOS and diabetes, continue   -stressed importance of diabetic diet and medication compliance  -A1c for next visit         Relevant Medications    tirzepatide (MOUNJARO) 5 mg/0.5 mL PnIj (Start on 10/13/2024)    tirzepatide (MOUNJARO) 2.5 mg/0.5 mL PnIj    Other Relevant Orders    TSH    Lipid Panel    Comprehensive Metabolic Panel    CBC Auto Differential    Vitamin D    Microalbumin/Creatinine Ratio, Urine    Hemoglobin A1C    Hemoglobin A1c greater than 8.0%    Relevant Medications    tirzepatide (MOUNJARO) 5 mg/0.5 mL PnIj (Start on 10/13/2024)    tirzepatide (MOUNJARO) 2.5 mg/0.5 mL PnIj    Other Relevant Orders    TSH    Lipid Panel    Comprehensive Metabolic Panel    CBC Auto Differential    Vitamin D    Microalbumin/Creatinine Ratio, Urine    Hemoglobin A1C          Follow up in about 3 months (around 12/13/2024) for Wellness with labs prior to visit.   -plan specifics discussed above    Orders Placed This Encounter    TSH    Lipid Panel    Comprehensive Metabolic Panel    CBC Auto Differential    Vitamin D    Microalbumin/Creatinine Ratio, Urine    Hemoglobin A1C     tirzepatide (MOUNJARO) 5 mg/0.5 mL PnIj    tirzepatide (MOUNJARO) 2.5 mg/0.5 mL PnIj    ergocalciferol (ERGOCALCIFEROL) 50,000 unit Cap        Medication List with Changes/Refills   New Medications    ERGOCALCIFEROL (ERGOCALCIFEROL) 50,000 UNIT CAP    Take 1 capsule (50,000 Units total) by mouth twice a week.    TIRZEPATIDE (MOUNJARO) 2.5 MG/0.5 ML PNIJ    Inject 2.5 mg into the skin every 7 days.    TIRZEPATIDE (MOUNJARO) 5 MG/0.5 ML PNIJ    Inject 5 mg into the skin every 7 days.   Current Medications    BLOOD SUGAR DIAGNOSTIC STRP    To check BG one times daily, to use with insurance preferred meter    BLOOD-GLUCOSE METER KIT    To check BG one times daily, to use with insurance preferred meter    FREESTYLE LANCETS 28 GAUGE LANCETS    Apply topically.    LANCETS MISC    To check BG  one times daily, to use with insurance preferred meter    MEDROXYPROGESTERONE (PROVERA) 10 MG TABLET    Take 1 tablet by mouth once daily at 6am. 14 days q month    METFORMIN (GLUCOPHAGE-XR) 500 MG ER 24HR TABLET    Take 2 tablets (1,000 mg total) by mouth every evening.    VALACYCLOVIR (VALTREX) 1000 MG TABLET    Take 1 tablet (1,000 mg total) by mouth every 12 (twelve) hours.   Discontinued Medications    SEMAGLUTIDE (OZEMPIC) 1 MG/DOSE (4 MG/3 ML)    Inject 1 mg into the skin every 7 days.

## 2024-09-13 NOTE — ASSESSMENT & PLAN NOTE
-acute on chronic  -previously on vitamin-D 16041 units twice weekly however no longer taking, re-initiate on  -vitamin-D level for next visit

## 2024-09-20 ENCOUNTER — PATIENT OUTREACH (OUTPATIENT)
Facility: CLINIC | Age: 29
End: 2024-09-20
Payer: COMMERCIAL

## 2024-09-20 NOTE — PROGRESS NOTES
Population Health Outreach.      [facility] Pt is enrolled & active       Updated Care Coordination Note, Care Everywhere, , Care Team Updated, and Immunizations Reconciliation Completed or Queried: [place]         Health Maintenance Topic(s) Outreach Outcomes & Actions Taken:    Cervical Cancer Screening - Outreach Outcomes & Actions Taken  : External Records Requested & Care Team Updated if Applicable    Eye Exam - Outreach Outcomes & Actions Taken  : External Records Requested & Care Team Updated if Applicable and Diabetic Eye External Records Uploaded, Care Team & History Updated if Applicable    Additional Notes:  Confirmed Last Eye exam was completed in 2022@ [facility].   Patient voiced she has an appointment with [facility] either this month or next (pending insurance change). I did notify her that we offer in office diabetic eye photos if needed.     Recent PAP 9/6/24 with Dr. Michelle Sheridan-record request sent.         Chronic Disease Management:   Lab Results   Component Value Date    HGBA1C 8.9 (H) 09/13/2024 9/13/24 NP with PCP office seen and made medication adjustments to diabetic medicine  -stressed importance of diabetic diet and medication compliance  -A1c for next visit     I sent a communication sent to Dr. Blake @ [facility] to notify office that patient is diabetic.     BP Readings from Last 1 Encounters:   12/05/23 136/88      Additional Notes:  Last PCP visit:9/13/24  Annual Wellness Visit: 12/20/24      Social Determinants of Health  UTD    OPCM Risk Score: 15.7     EDUCATION :Declines need for podiatrist. Said she has no noticeable diminished feeling to her feet. Made aware of why diabetic foot checks are so important,showed understanding with intent to comply with home foot checks.

## 2024-09-20 NOTE — LETTER
AUTHORIZATION FOR RELEASE OF   CONFIDENTIAL INFORMATION    Dear Dr. Sheridan,    We are seeing Joana Gallo, date of birth 1995, in the clinic at [place] [address] [facility]. Paz Vega MD is the patient's PCP. Joana Gallo has an outstanding lab/procedure at the time we reviewed her chart. In order to help keep her health information updated, she has authorized us to request the following medical record(s):           ( X )  PAP SMEAR                                                     Please fax records to [facility], Bhanushali, Reshma A, MD, [phone]       If you have any questions, please contact Caren at [phone]            Patient Name: Joana Gallo  : 1995  Patient Phone #: [phone]

## 2024-09-20 NOTE — LETTER
AUTHORIZATION FOR RELEASE OF   CONFIDENTIAL INFORMATION    Dear Dr. Blake,    We are seeing Joana Gallo, date of birth 1995, in the clinic at [place] [address] [facility]. Paz Vega MD is the patient's PCP. Joana Gallo has an outstanding lab/procedure at the time we reviewed her chart. In order to help keep her health information updated, she has authorized us to request the following medical record(s):         ( X )DM  EYE EXAM   if completed after      Please make MD aware that patient is diabetic.                    Please fax records to [facility], Bhanushali, Reshma A, MD, [phone]       If you have any questions, please contact Caren at [phone]          Patient Name: Joana Gallo  : 1995  Patient Phone #: [phone]

## 2024-09-21 LAB
LEFT EYE DM RETINOPATHY: NEGATIVE
RIGHT EYE DM RETINOPATHY: NEGATIVE

## 2024-09-23 ENCOUNTER — PATIENT OUTREACH (OUTPATIENT)
Facility: CLINIC | Age: 29
End: 2024-09-23
Payer: COMMERCIAL

## 2024-09-23 NOTE — PROGRESS NOTES
Health Maintenance Topic(s) Outreach Outcomes & Actions Taken:    Cervical Cancer Screening - Outreach Outcomes & Actions Taken  : External Records Uploaded & Care Team Updated if Applicable     Additional Notes:  Upload Pap Smear, HPV 9/5/2024

## 2024-09-27 ENCOUNTER — TELEPHONE (OUTPATIENT)
Dept: INTERNAL MEDICINE | Facility: CLINIC | Age: 29
End: 2024-09-27

## 2024-09-27 ENCOUNTER — PATIENT OUTREACH (OUTPATIENT)
Facility: CLINIC | Age: 29
End: 2024-09-27
Payer: COMMERCIAL

## 2024-09-27 ENCOUNTER — OFFICE VISIT (OUTPATIENT)
Dept: INTERNAL MEDICINE | Facility: CLINIC | Age: 29
End: 2024-09-27
Payer: COMMERCIAL

## 2024-09-27 DIAGNOSIS — H66.012 NON-RECURRENT ACUTE SUPPURATIVE OTITIS MEDIA OF LEFT EAR WITH SPONTANEOUS RUPTURE OF TYMPANIC MEMBRANE: Primary | ICD-10-CM

## 2024-09-27 DIAGNOSIS — R09.81 COUGH WITH CONGESTION OF PARANASAL SINUS: ICD-10-CM

## 2024-09-27 DIAGNOSIS — H66.012 NON-RECURRENT ACUTE SUPPURATIVE OTITIS MEDIA OF LEFT EAR WITH SPONTANEOUS RUPTURE OF TYMPANIC MEMBRANE: ICD-10-CM

## 2024-09-27 DIAGNOSIS — R05.8 COUGH WITH CONGESTION OF PARANASAL SINUS: ICD-10-CM

## 2024-09-27 PROBLEM — R05.9 COUGH: Status: ACTIVE | Noted: 2024-09-27

## 2024-09-27 LAB
FLUAV AG UPPER RESP QL IA.RAPID: NOT DETECTED
FLUBV AG UPPER RESP QL IA.RAPID: NOT DETECTED
RSV A 5' UTR RNA NPH QL NAA+PROBE: NOT DETECTED
SARS-COV-2 RNA RESP QL NAA+PROBE: NOT DETECTED

## 2024-09-27 PROCEDURE — 0241U COVID/RSV/FLU A&B PCR: CPT

## 2024-09-27 RX ORDER — CIPROFLOXACIN AND DEXAMETHASONE 3; 1 MG/ML; MG/ML
4 SUSPENSION/ DROPS AURICULAR (OTIC) 2 TIMES DAILY
Qty: 7.5 ML | Status: SHIPPED | OUTPATIENT
Start: 2024-09-27 | End: 2024-10-07

## 2024-09-27 RX ORDER — AMOXICILLIN AND CLAVULANATE POTASSIUM 875; 125 MG/1; MG/1
1 TABLET, FILM COATED ORAL EVERY 12 HOURS
Qty: 14 TABLET | Refills: 0 | Status: SHIPPED | OUTPATIENT
Start: 2024-09-27 | End: 2024-10-04

## 2024-09-27 RX ORDER — CIPROFLOXACIN AND DEXAMETHASONE 3; 1 MG/ML; MG/ML
4 SUSPENSION/ DROPS AURICULAR (OTIC) 2 TIMES DAILY
Qty: 7.5 ML | Status: SHIPPED | OUTPATIENT
Start: 2024-09-27 | End: 2024-09-27 | Stop reason: SDUPTHER

## 2024-09-27 RX ORDER — CIPROFLOXACIN AND DEXAMETHASONE 3; 1 MG/ML; MG/ML
4 SUSPENSION/ DROPS AURICULAR (OTIC) 2 TIMES DAILY
Qty: 7.5 ML | Status: SHIPPED | OUTPATIENT
Start: 2024-09-27 | End: 2024-09-27

## 2024-09-27 NOTE — PROGRESS NOTES
Patient ID: Joana Gallo is a 29 y.o. female.    Chief Complaint: Cough (Productive yellow mucous, green discharge from her nose. Sinus pain and pressure. No fever, body aches or chills noted. No N/V/D. Started this past Wednesday. )    Joana Gallo is a 29 y.o. female, known to Dr Vega, presents today for a requested visit.  Medical comorbidities include  DM 2, PCOS, morbid obesity, vitamin-D insufficiency, and genital herpes not needing chronic suppression.  Today presents with complaints of ear pain times 2 weeks, and ear itching.  Also with new onset nasal congestion, sore throat, and cough x3 days.  Denies fevers, chest tightness, shortness a breath.  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  Discussed obtaining nasal testing for RSV/influenza/COVID for which patient was in agreeance to.  Otherwise denies any other acute medical concerns.     Wellness:  12/05/2023          MEDICAL HISTORY:    Past Medical History:   Diagnosis Date    Allergy     Depression     Genital herpes     Irritable bowel syndrome, unspecified type     Morbid obesity due to excess calories     PCOS (polycystic ovarian syndrome)     Type 2 diabetes mellitus with hyperglycemia, without long-term current use of insulin     Vitamin D insufficiency       History reviewed. No pertinent surgical history.   Social History     Tobacco Use    Smoking status: Never    Smokeless tobacco: Never   Substance Use Topics    Alcohol use: Yes     Comment: once a month    Drug use: Never          Health Maintenance Due   Topic Date Due    Pneumococcal Vaccines (Age 0-64) (1 of 2 - PCV) Never done    Foot Exam  Never done    TETANUS VACCINE  Never done    Eye Exam  11/25/2023    Influenza Vaccine (1) Never done    COVID-19 Vaccine (3 - 2023-24 season) 09/01/2024          Patient Care Team:  Paz Vega MD as PCP - General (Internal Medicine)  Lou  MITZI Encarnacion (Optometry)  Caren Robertson LPN as Care Coordinator  Michelle Sheridan MD (Obstetrics and Gynecology)      Review of Systems   Constitutional:  Positive for fatigue. Negative for fever.   HENT:  Positive for congestion, ear pain, postnasal drip, sinus pressure, sneezing and sore throat. Negative for rhinorrhea and trouble swallowing.    Eyes:  Negative for redness and visual disturbance.   Respiratory:  Positive for cough. Negative for chest tightness and shortness of breath.    Cardiovascular:  Negative for chest pain and palpitations.   Gastrointestinal:  Negative for abdominal pain, constipation, diarrhea, nausea and vomiting.   Genitourinary:  Negative for dysuria, flank pain, frequency and urgency.   Musculoskeletal:  Negative for arthralgias, gait problem and myalgias.   Skin:  Negative for rash and wound.   Neurological:  Negative for facial asymmetry, speech difficulty, weakness and headaches.   All other systems reviewed and are negative.      Objective:   BP (P) 128/72 (BP Location: Right arm)   Pulse (P) 80   Ht (P) 6' (1.829 m)   Wt (!) (P) 166 kg (366 lb)   LMP 09/10/2024   SpO2 (P) 98%   BMI (P) 49.64 kg/m²      Physical Exam  Constitutional:       General: She is not in acute distress.     Appearance: Normal appearance.   HENT:      Right Ear: Ear canal and external ear normal. Tenderness present. No mastoid tenderness. Tympanic membrane is bulging.      Left Ear: Ear canal and external ear normal. Tenderness present. No mastoid tenderness. Tympanic membrane is perforated, erythematous and bulging.      Nose: Nose normal.      Mouth/Throat:      Mouth: Mucous membranes are moist.      Pharynx: Pharyngeal swelling and posterior oropharyngeal erythema present. No oropharyngeal exudate or uvula swelling.   Eyes:      Extraocular Movements: Extraocular movements intact.      Conjunctiva/sclera: Conjunctivae normal.      Pupils: Pupils are equal, round, and reactive to light.    Cardiovascular:      Rate and Rhythm: Normal rate and regular rhythm.      Pulses: Normal pulses.      Heart sounds: Normal heart sounds. No murmur heard.     No gallop.   Pulmonary:      Effort: Pulmonary effort is normal.      Breath sounds: Normal breath sounds. No wheezing.   Abdominal:      General: Bowel sounds are normal. There is no distension.      Palpations: Abdomen is soft. There is no mass.      Tenderness: There is no abdominal tenderness. There is no guarding.   Musculoskeletal:         General: Normal range of motion.   Skin:     General: Skin is warm and dry.   Neurological:      Mental Status: She is alert. Mental status is at baseline.      Sensory: No sensory deficit.      Motor: No weakness.           Assessment:       ICD-10-CM ICD-9-CM   1. Non-recurrent acute suppurative otitis media of left ear with spontaneous rupture of tympanic membrane  H66.012 382.01   2. Cough with congestion of paranasal sinus  R05.8 786.2    R09.81 478.19        Plan:     Problem List Items Addressed This Visit          ENT    Non-recurrent acute suppurative otitis media of left ear with spontaneous rupture of tympanic membrane - Primary     -initiate Augmentin   -initiate Ciprodex otic  -Encourage daily Antihistamine  -Tylenol/ibuprofen for body aches and low-grade temps  -stressed importance of not submerging ear under water   -safety precautions discussed   -notify clinic for any new or worsening symptoms           Relevant Medications    amoxicillin-clavulanate 875-125mg (AUGMENTIN) 875-125 mg per tablet    ciprofloxacin-dexAMETHasone 0.3-0.1% (CIPRODEX) 0.3-0.1 % DrpS       Pulmonary    Cough     -obtain COVID/RSV/FLU PCR  -Encourage daily Antihistamine  -Okay to utilize Flonase BID  -Steam inhalation  -Tylenol/ibuprofen for body aches and low-grade temps  -OTC cough syrup as indicated  -okay to utilize Vit C , Vit D, and Zinc  -safety precautions/quarantine discussed   -notify clinic for any new or worsening  symptoms                Follow up for Previously scheduled and PRN if need.   -plan specifics discussed above    Orders Placed This Encounter    COVID/RSV/FLU A&B PCR    amoxicillin-clavulanate 875-125mg (AUGMENTIN) 875-125 mg per tablet    ciprofloxacin-dexAMETHasone 0.3-0.1% (CIPRODEX) 0.3-0.1 % DrpS        Medication List with Changes/Refills   New Medications    AMOXICILLIN-CLAVULANATE 875-125MG (AUGMENTIN) 875-125 MG PER TABLET    Take 1 tablet by mouth every 12 (twelve) hours. for 7 days    CIPROFLOXACIN-DEXAMETHASONE 0.3-0.1% (CIPRODEX) 0.3-0.1 % DRPS    Place 4 drops into both ears 2 (two) times daily. for 10 days   Current Medications    BLOOD SUGAR DIAGNOSTIC STRP    To check BG one times daily, to use with insurance preferred meter    BLOOD-GLUCOSE METER KIT    To check BG one times daily, to use with insurance preferred meter    ERGOCALCIFEROL (ERGOCALCIFEROL) 50,000 UNIT CAP    Take 1 capsule (50,000 Units total) by mouth twice a week.    FREESTYLE LANCETS 28 GAUGE LANCETS    Apply topically.    LANCETS MISC    To check BG  one times daily, to use with insurance preferred meter    MEDROXYPROGESTERONE (PROVERA) 10 MG TABLET    Take 1 tablet by mouth once daily at 6am. 14 days q month    METFORMIN (GLUCOPHAGE-XR) 500 MG ER 24HR TABLET    Take 2 tablets (1,000 mg total) by mouth every evening.    TIRZEPATIDE (MOUNJARO) 2.5 MG/0.5 ML PNIJ    Inject 2.5 mg into the skin every 7 days.    TIRZEPATIDE (MOUNJARO) 5 MG/0.5 ML PNIJ    Inject 5 mg into the skin every 7 days.    VALACYCLOVIR (VALTREX) 1000 MG TABLET    Take 1 tablet (1,000 mg total) by mouth every 12 (twelve) hours.

## 2024-09-27 NOTE — ASSESSMENT & PLAN NOTE
-initiate Augmentin   -initiate Ciprodex otic  -Encourage daily Antihistamine  -Tylenol/ibuprofen for body aches and low-grade temps  -stressed importance of not submerging ear under water   -safety precautions discussed   -notify clinic for any new or worsening symptoms

## 2024-09-27 NOTE — TELEPHONE ENCOUNTER
----- Message from Poonam Xiao sent at 9/27/2024 11:37 AM CDT -----  .Type:  Patient Returning Call    Who Called:pt  Who Left Message for Patient:pt  Does the patient know what this is regarding?:ciprofloxacin-dexAMETHasone 0.3-0.1% (CIPRODEX) 0.3-0.1 % DrpS  Would the patient rather a call back or a response via [facility]?   Best Call Back Number:[phone]  Additional Information: Please resend to pharmacy ciprofloxacin-dexAMETHasone 0.3-0.1% (CIPRODEX) 0.3-0.1 % DrpS

## 2024-09-30 ENCOUNTER — TELEPHONE (OUTPATIENT)
Dept: INTERNAL MEDICINE | Facility: CLINIC | Age: 29
End: 2024-09-30
Payer: COMMERCIAL

## 2024-09-30 NOTE — TELEPHONE ENCOUNTER
----- Message from Alexi sent at 9/30/2024  9:04 AM CDT -----  .Type:  Needs Medical Advice    Who Called: Patient  Symptoms (please be specific):    How long has patient had these symptoms:    Pharmacy name and phone #:    Would the patient rather a call back or a response via [facility]?   Best Call Back Number: [phone]  Additional Information: Patient requested to speak with the NP or a nurse for him. Re: question about her ear drop medication a little too expensive she told me.

## 2024-10-02 ENCOUNTER — TELEPHONE (OUTPATIENT)
Dept: INTERNAL MEDICINE | Facility: CLINIC | Age: 29
End: 2024-10-02
Payer: COMMERCIAL

## 2024-10-14 ENCOUNTER — LAB VISIT (OUTPATIENT)
Dept: LAB | Facility: HOSPITAL | Age: 29
End: 2024-10-14
Attending: INTERNAL MEDICINE
Payer: COMMERCIAL

## 2024-10-14 ENCOUNTER — TELEPHONE (OUTPATIENT)
Dept: INTERNAL MEDICINE | Facility: CLINIC | Age: 29
End: 2024-10-14
Payer: COMMERCIAL

## 2024-10-14 DIAGNOSIS — N94.89 VAGINAL BURNING: Primary | ICD-10-CM

## 2024-10-14 DIAGNOSIS — N94.89 VAGINAL BURNING: ICD-10-CM

## 2024-10-14 LAB
BACTERIA #/AREA URNS AUTO: ABNORMAL /HPF
BILIRUB UR QL STRIP.AUTO: NEGATIVE
CLARITY UR: ABNORMAL
COLOR UR AUTO: YELLOW
GLUCOSE UR QL STRIP: NORMAL
HGB UR QL STRIP: ABNORMAL
KETONES UR QL STRIP: NEGATIVE
LEUKOCYTE ESTERASE UR QL STRIP: 500
NITRITE UR QL STRIP: NEGATIVE
PH UR STRIP: 5.5 [PH]
PROT UR QL STRIP: ABNORMAL
RBC #/AREA URNS AUTO: >100 /HPF
SP GR UR STRIP.AUTO: 1.02 (ref 1–1.03)
SQUAMOUS #/AREA URNS LPF: ABNORMAL /HPF
TRICHOMONAS UR QL COMP ASSIST: ABNORMAL /HPF
UROBILINOGEN UR STRIP-ACNC: NORMAL
WBC #/AREA URNS AUTO: >100 /HPF
WBC CLUMPS UR QL AUTO: ABNORMAL

## 2024-10-14 PROCEDURE — 87086 URINE CULTURE/COLONY COUNT: CPT

## 2024-10-14 PROCEDURE — 81001 URINALYSIS AUTO W/SCOPE: CPT

## 2024-10-14 PROCEDURE — 87591 N.GONORRHOEAE DNA AMP PROB: CPT

## 2024-10-14 PROCEDURE — 87077 CULTURE AEROBIC IDENTIFY: CPT

## 2024-10-14 PROCEDURE — 81015 MICROSCOPIC EXAM OF URINE: CPT

## 2024-10-14 RX ORDER — CIPROFLOXACIN 500 MG/1
500 TABLET ORAL EVERY 12 HOURS
Qty: 14 TABLET | Refills: 0 | Status: SHIPPED | OUTPATIENT
Start: 2024-10-14

## 2024-10-14 NOTE — TELEPHONE ENCOUNTER
----- Message from Jhonny sent at 10/14/2024 10:01 AM CDT -----  .Who Called: Joana Gallo    Caller is requesting assistance/information from provider's office.    Symptoms (please be specific): n/a   How long has patient had these symptoms:  n/a  List of preferred pharmacies on file (remove unneeded): [unfilled]  If different, enter pharmacy into here including location and phone number: n/a      Preferred Method of Contact: Phone Call or My Chart    Patient's Preferred Phone Number on File: [phone]     Best Call Back Number, if different:    Additional Information: Pt states she's on antibiotics for yeast  infection but she's still having symptoms. Pt is wanting to submit urine culture and would like it to be test for everything, BV, std..etc. If pt doesn't answer, please leave a message or send message through portal. Pt will go to [facility]. Please advise, thank you

## 2024-10-14 NOTE — TELEPHONE ENCOUNTER
Patient has a urinary tract infection that looks quite abnormal.  Please notify patient that I have sent a prescription for ciprofloxacin 500 mg p.o. b.i.d. for 7 days.  We will continue to monitor cultures and keep her notified if antibiotic needs to be changed    Medications Ordered This Encounter   Medications    ciprofloxacin HCl (CIPRO) 500 MG tablet     Sig: Take 1 tablet (500 mg total) by mouth every 12 (twelve) hours.     Dispense:  14 tablet     Refill:  0

## 2024-10-14 NOTE — TELEPHONE ENCOUNTER
----- Message from Alexi sent at 10/14/2024  4:05 PM CDT -----  .Type:  Needs Medical Advice    Who Called: Joana  Symptoms (please be specific):    How long has patient had these symptoms:    Pharmacy name and phone #:    Would the patient rather a call back or a response via [facility]?   Best Call Back Number: [phone]  Additional Information: Patient requested to speak with the nurse re:  returning a call from the nurse she told me. Thanks,

## 2024-10-14 NOTE — TELEPHONE ENCOUNTER
Please inform patient I am able to order a urine analysis as well as gonorrhea/Trichomonas per urine.  However for other things such as bacterial vaginosis, she must see gyn for vaginal swab/testing.

## 2024-10-14 NOTE — TELEPHONE ENCOUNTER
Called and spoke with patient she was given results and recommendations. Verbalized understanding.

## 2024-10-14 NOTE — TELEPHONE ENCOUNTER
----- Message from Poonam sent at 10/14/2024 11:59 AM CDT -----  .Type:  Patient Returning Call    Who Called:pt  Who Left Message for Patient:pt  Does the patient know what this is regarding?:urine culture   Would the patient rather a call back or a response via [facility]?   Best Call Back Number:[phone]  Additional Information: Please call when urine culture has been put in

## 2024-10-14 NOTE — TELEPHONE ENCOUNTER
Spoke with patient and she was given recommendations, will do urine tests and follow up with GYN for further evaluation.

## 2024-10-16 LAB
BACTERIA UR CULT: ABNORMAL
BACTERIA UR CULT: ABNORMAL
C TRACH RRNA SPEC QL NAA+PROBE: NEGATIVE
N GONORRHOEA RRNA SPEC QL NAA+PROBE: NEGATIVE
SPECIMEN SOURCE: NORMAL
SPECIMEN SOURCE: NORMAL

## 2024-10-17 ENCOUNTER — TELEPHONE (OUTPATIENT)
Dept: INTERNAL MEDICINE | Facility: CLINIC | Age: 29
End: 2024-10-17
Payer: COMMERCIAL

## 2024-10-17 ENCOUNTER — PATIENT MESSAGE (OUTPATIENT)
Dept: INTERNAL MEDICINE | Facility: CLINIC | Age: 29
End: 2024-10-17
Payer: COMMERCIAL

## 2024-10-17 DIAGNOSIS — A59.03 TRICHOMONAL CYSTITIS: Primary | ICD-10-CM

## 2024-10-17 RX ORDER — METRONIDAZOLE 500 MG/1
500 TABLET ORAL EVERY 12 HOURS
Qty: 14 TABLET | Refills: 0 | Status: SHIPPED | OUTPATIENT
Start: 2024-10-17 | End: 2024-10-24

## 2024-10-17 NOTE — PROGRESS NOTES
Inform most recent results noted chlamydia, gonorrhea negative.  Urine culture appears to be growing out strep variant.     - If she is still symptomatic please switch antibiotic to Augmentin 875 b.i.d. x7 days.

## 2024-10-17 NOTE — TELEPHONE ENCOUNTER
----- Message from Ngozi sent at 10/17/2024 12:19 PM CDT -----  Who Called: Joana Castro Sabino    Patient is returning phone call    Who Left Message for Patient: Nadira Bustamante CMA  Does the patient know what this is regarding?:Results       Preferred Method of Contact: Phone Call  Patient's Preferred Phone Number on File: [phone]   Best Call Back Number, if different:  Additional Information: Pt stated that results can be left on VM or sent through the portal, she is at work and can not answer her phone.

## 2024-10-17 NOTE — TELEPHONE ENCOUNTER
----- Message from BEKA Avalos sent at 10/17/2024  7:19 AM CDT -----  Inform most recent results noted chlamydia, gonorrhea negative.  Urine culture appears to be growing out strep variant.     - If she is still symptomatic please switch antibiotic to Augmentin 875 b.i.d. x7 days.

## 2024-10-17 NOTE — TELEPHONE ENCOUNTER
----- Message from Kasia sent at 10/17/2024  8:43 AM CDT -----  Regarding: returned call  Who Called: Joana Gallo    Patient is returning phone call    Who Left Message for Patient: Nadira  Does the patient know what this is regarding?: Results      Preferred Method of Contact: Phone Call    Patient's Preferred Phone Number on File: [phone]     Best Call Back Number, if different:  Additional Information:

## 2024-10-18 ENCOUNTER — TELEPHONE (OUTPATIENT)
Dept: INTERNAL MEDICINE | Facility: CLINIC | Age: 29
End: 2024-10-18
Payer: COMMERCIAL

## 2024-10-18 DIAGNOSIS — A59.03 TRICHOMONAL CYSTITIS: Primary | ICD-10-CM

## 2024-10-18 RX ORDER — AMOXICILLIN AND CLAVULANATE POTASSIUM 875; 125 MG/1; MG/1
1 TABLET, FILM COATED ORAL EVERY 12 HOURS
Qty: 14 TABLET | Refills: 0 | Status: SHIPPED | OUTPATIENT
Start: 2024-10-18 | End: 2024-10-25

## 2024-10-30 ENCOUNTER — TELEPHONE (OUTPATIENT)
Dept: INTERNAL MEDICINE | Facility: CLINIC | Age: 29
End: 2024-10-30
Payer: COMMERCIAL

## 2024-11-14 ENCOUNTER — TELEPHONE (OUTPATIENT)
Dept: INTERNAL MEDICINE | Facility: CLINIC | Age: 29
End: 2024-11-14
Payer: COMMERCIAL

## 2024-11-14 NOTE — TELEPHONE ENCOUNTER
----- Message from One Season sent at 11/14/2024  9:39 AM CST -----  .Who Called: Joana Gallo    Caller is requesting assistance/information from provider's office.    Symptoms (please be specific): Ear infection    How long has patient had these symptoms:  ongoing   List of preferred pharmacies on file (remove unneeded): [facility] [address]   Preferred Method of Contact: Phone Call  Patient's Preferred Phone Number on File: [phone]   Best Call Back Number, if different:  Additional Information: Pt is requesting to speak with  the providers Nurse. She stated he prescribed her a medication for an ear infection a few months back and since then she has had a bad sinus infection causing another ear infection. Pt stated that if she doesn't answer to leave a message via the portal. She stated she needs the oral medication and ear drops Please call to advise

## 2024-11-18 ENCOUNTER — OFFICE VISIT (OUTPATIENT)
Dept: INTERNAL MEDICINE | Facility: CLINIC | Age: 29
End: 2024-11-18
Payer: COMMERCIAL

## 2024-11-18 ENCOUNTER — TELEPHONE (OUTPATIENT)
Dept: INTERNAL MEDICINE | Facility: CLINIC | Age: 29
End: 2024-11-18

## 2024-11-18 VITALS
SYSTOLIC BLOOD PRESSURE: 124 MMHG | HEIGHT: 72 IN | HEART RATE: 92 BPM | WEIGHT: 293 LBS | BODY MASS INDEX: 39.68 KG/M2 | OXYGEN SATURATION: 98 % | DIASTOLIC BLOOD PRESSURE: 82 MMHG

## 2024-11-18 DIAGNOSIS — E11.65 UNCONTROLLED DIABETES MELLITUS WITH HYPERGLYCEMIA, WITHOUT LONG-TERM CURRENT USE OF INSULIN: ICD-10-CM

## 2024-11-18 DIAGNOSIS — H66.004 RECURRENT ACUTE SUPPURATIVE OTITIS MEDIA OF RIGHT EAR WITHOUT SPONTANEOUS RUPTURE OF TYMPANIC MEMBRANE: ICD-10-CM

## 2024-11-18 DIAGNOSIS — R73.09 HEMOGLOBIN A1C GREATER THAN 8.0%: Primary | ICD-10-CM

## 2024-11-18 PROCEDURE — 3079F DIAST BP 80-89 MM HG: CPT | Mod: CPTII,,,

## 2024-11-18 PROCEDURE — 1160F RVW MEDS BY RX/DR IN RCRD: CPT | Mod: CPTII,,,

## 2024-11-18 PROCEDURE — 96372 THER/PROPH/DIAG INJ SC/IM: CPT | Mod: ,,,

## 2024-11-18 PROCEDURE — 99214 OFFICE O/P EST MOD 30 MIN: CPT | Mod: 25,,,

## 2024-11-18 PROCEDURE — 3074F SYST BP LT 130 MM HG: CPT | Mod: CPTII,,,

## 2024-11-18 PROCEDURE — 3008F BODY MASS INDEX DOCD: CPT | Mod: CPTII,,,

## 2024-11-18 PROCEDURE — 1159F MED LIST DOCD IN RCRD: CPT | Mod: CPTII,,,

## 2024-11-18 PROCEDURE — 3052F HG A1C>EQUAL 8.0%<EQUAL 9.0%: CPT | Mod: CPTII,,,

## 2024-11-18 RX ORDER — CEFTRIAXONE 1 G/1
1 INJECTION, POWDER, FOR SOLUTION INTRAMUSCULAR; INTRAVENOUS
Status: CANCELLED | OUTPATIENT
Start: 2024-11-18 | End: 2024-11-18

## 2024-11-18 RX ORDER — DEXAMETHASONE SODIUM PHOSPHATE 4 MG/ML
4 INJECTION, SOLUTION INTRA-ARTICULAR; INTRALESIONAL; INTRAMUSCULAR; INTRAVENOUS; SOFT TISSUE
Status: CANCELLED | OUTPATIENT
Start: 2024-11-18 | End: 2024-11-18

## 2024-11-18 RX ORDER — FEXOFENADINE HCL AND PSEUDOEPHEDRINE HCL 180; 240 MG/1; MG/1
1 TABLET, EXTENDED RELEASE ORAL DAILY
Qty: 7 TABLET | Refills: 0 | Status: CANCELLED | OUTPATIENT
Start: 2024-11-18 | End: 2024-11-25

## 2024-11-18 RX ORDER — DEXAMETHASONE SODIUM PHOSPHATE 4 MG/ML
4 INJECTION, SOLUTION INTRA-ARTICULAR; INTRALESIONAL; INTRAMUSCULAR; INTRAVENOUS; SOFT TISSUE
Status: COMPLETED | OUTPATIENT
Start: 2024-11-18 | End: 2024-11-18

## 2024-11-18 RX ORDER — DOXYCYCLINE HYCLATE 100 MG
100 TABLET ORAL EVERY 12 HOURS
Qty: 14 TABLET | Refills: 0 | Status: SHIPPED | OUTPATIENT
Start: 2024-11-18 | End: 2024-11-25

## 2024-11-18 RX ORDER — DOXYCYCLINE HYCLATE 100 MG
100 TABLET ORAL EVERY 12 HOURS
Qty: 14 TABLET | Refills: 0 | Status: CANCELLED | OUTPATIENT
Start: 2024-11-18 | End: 2024-11-25

## 2024-11-18 RX ORDER — CEFTRIAXONE 1 G/1
1 INJECTION, POWDER, FOR SOLUTION INTRAMUSCULAR; INTRAVENOUS
Status: COMPLETED | OUTPATIENT
Start: 2024-11-18 | End: 2024-11-18

## 2024-11-18 RX ADMIN — DEXAMETHASONE SODIUM PHOSPHATE 4 MG: 4 INJECTION, SOLUTION INTRA-ARTICULAR; INTRALESIONAL; INTRAMUSCULAR; INTRAVENOUS; SOFT TISSUE at 02:11

## 2024-11-18 RX ADMIN — CEFTRIAXONE 1 G: 1 INJECTION, POWDER, FOR SOLUTION INTRAMUSCULAR; INTRAVENOUS at 02:11

## 2024-11-18 NOTE — PROGRESS NOTES
Patient ID: Joana Gallo is a 29 y.o. female.    Chief Complaint: Otalgia (Bilateral otalgia, started last Tuesday. Hard to hear out her right ear. Sinus pressure and pain last week. Still having some rhinorrhea. Non productive cough right now. No fever, body aches or chills. )    Joana Gallo is a 29 y.o. female, known to Dr Vega, presents today for a requested visit.  Medical comorbidities include  DM 2, PCOS, morbid obesity, vitamin-D insufficiency, and genital herpes not needing chronic suppression.  Today presents with complaints of ear pain times 1 week.  Also with nasal congestion x7 days.  Denies fevers, chest tightness, shortness a breath.  Last visit with similar symptoms.  At that time noted RSV/influenza/COVID negative.  Treated with Augmentin and OTC antihistamines.  Denies fever, chills, shortness a breath, or cough. 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  Does state initially felt improvement, however subsequently symptoms returned after a week or 2.  Patient does have history of recurrent ear infections as a child requiring Eustachian tubes.  Considering current symptoms, agreeable to ENT referral along with treatment for current symptoms.  Also currently on Mounjaro for uncontrolled dm 2.  Reports home blood sugars greater than 140s range.  Discussed increasing Mounjaro from 5 mg to 7.5 mg weekly with A1c for upcoming visit.  Otherwise denies any other acute medical concerns.     Wellness:  12/05/2023             MEDICAL HISTORY:    Past Medical History:   Diagnosis Date    Allergy     Depression     Genital herpes     Irritable bowel syndrome, unspecified type     Morbid obesity due to excess calories     PCOS (polycystic ovarian syndrome)     Type 2 diabetes mellitus with hyperglycemia, without long-term current use of insulin     Vitamin D insufficiency       History reviewed. No pertinent surgical  history.   Social History     Tobacco Use    Smoking status: Never    Smokeless tobacco: Never   Substance Use Topics    Alcohol use: Yes     Comment: once a month    Drug use: Never          Health Maintenance Due   Topic Date Due    Pneumococcal Vaccines (Age 0-64) (1 of 2 - PCV) Never done    Foot Exam  Never done    TETANUS VACCINE  Never done    Eye Exam  11/25/2023    Influenza Vaccine (1) Never done    COVID-19 Vaccine (3 - 2024-25 season) 09/01/2024    Diabetes Urine Screening  12/05/2024    Lipid Panel  12/05/2024    Hemoglobin A1c  12/13/2024          Patient Care Team:  Paz Vega MD as PCP - General (Internal Medicine)  Shashank Blake OD (Optometry)  Caren Robertson LPN as Care Coordinator  Michelle Sheridan MD (Obstetrics and Gynecology)      Review of Systems   Constitutional:  Negative for fatigue and fever.   HENT:  Positive for congestion and ear pain. Negative for rhinorrhea, sore throat and trouble swallowing.    Eyes:  Negative for redness and visual disturbance.   Respiratory:  Negative for cough, chest tightness and shortness of breath.    Cardiovascular:  Negative for chest pain and palpitations.   Gastrointestinal:  Negative for abdominal pain, constipation, diarrhea, nausea and vomiting.   Genitourinary:  Negative for dysuria, flank pain, frequency and urgency.   Musculoskeletal:  Negative for arthralgias, gait problem and myalgias.   Skin:  Negative for rash and wound.   Neurological:  Negative for facial asymmetry, speech difficulty, weakness and headaches.   All other systems reviewed and are negative.      Objective:   /82 (BP Location: Left arm, Patient Position: Sitting)   Pulse 92   Ht 6' (1.829 m)   Wt (!) 162.8 kg (359 lb)   LMP 10/22/2024   SpO2 98%   BMI 48.69 kg/m²      Physical Exam  Constitutional:       General: She is not in acute distress.     Appearance: Normal appearance.   HENT:      Right Ear: Ear canal and external ear normal. A middle ear  effusion is present. Tympanic membrane is erythematous.      Left Ear: Ear canal and external ear normal. A middle ear effusion is present.      Nose: Nose normal.      Mouth/Throat:      Mouth: Mucous membranes are moist.      Pharynx: Oropharynx is clear.   Eyes:      Extraocular Movements: Extraocular movements intact.      Conjunctiva/sclera: Conjunctivae normal.      Pupils: Pupils are equal, round, and reactive to light.   Cardiovascular:      Rate and Rhythm: Normal rate and regular rhythm.      Pulses: Normal pulses.      Heart sounds: Normal heart sounds. No murmur heard.     No gallop.   Pulmonary:      Effort: Pulmonary effort is normal.      Breath sounds: Normal breath sounds. No wheezing.   Abdominal:      General: Bowel sounds are normal. There is no distension.      Palpations: Abdomen is soft. There is no mass.      Tenderness: There is no abdominal tenderness. There is no guarding.   Musculoskeletal:         General: Normal range of motion.   Skin:     General: Skin is warm and dry.   Neurological:      Mental Status: She is alert. Mental status is at baseline.      Sensory: No sensory deficit.      Motor: No weakness.           Assessment:       ICD-10-CM ICD-9-CM   1. Hemoglobin A1c greater than 8.0%  R73.09 790.29   2. Recurrent acute suppurative otitis media of right ear without spontaneous rupture of tympanic membrane  H66.004 382.00   3. Uncontrolled diabetes mellitus with hyperglycemia, without long-term current use of insulin  E11.65 250.02        Plan:     Problem List Items Addressed This Visit          ENT    Recurrent acute suppurative otitis media of right ear without spontaneous rupture of tympanic membrane     -recurrent   -previously treated with Augmentin   -IM Rocephin in office   -initiate doxycycline  -encouraged to utilize OTC antihistamine  -history of recurrent ear infections as a child  -referred to ENT for further evaluation         Relevant Orders    Ambulatory  referral/consult to ENT       Endocrine    Uncontrolled diabetes mellitus with hyperglycemia, without long-term current use of insulin           Lab Results   Component Value Date     HGBA1C 8.9 (H) 09/13/2024      Acute on chronic   -recently placed back onto Mounjaro 5 mg weekly , however reports home blood sugars in the 140s range   -increase Mounjaro to 7.5 mg weekly  -also on metformin for PCOS and diabetes, discontinue since increasing Mounjaro  -stressed importance of diabetic diet and medication compliance  -A1c for next visit            Hemoglobin A1c greater than 8.0% - Primary    Relevant Medications    tirzepatide 7.5 mg/0.5 mL PnIj          No follow-ups on file.   -plan specifics discussed above    Orders Placed This Encounter    Ambulatory referral/consult to ENT    cefTRIAXone injection 1 g    dexAMETHasone injection 4 mg    doxycycline (VIBRA-TABS) 100 MG tablet    tirzepatide 7.5 mg/0.5 mL PnIj        Medication List with Changes/Refills   New Medications    CIPROFLOXACIN HCL 0.2 % OTIC SOLUTION    Place 4 drops into both ears 2 (two) times daily. for 7 days    TIRZEPATIDE 7.5 MG/0.5 ML PNIJ    Inject 7.5 mg into the skin every 7 days.   Current Medications    BLOOD SUGAR DIAGNOSTIC STRP    To check BG one times daily, to use with insurance preferred meter    BLOOD-GLUCOSE METER KIT    To check BG one times daily, to use with insurance preferred meter    CIPROFLOXACIN HCL (CIPRO) 500 MG TABLET    Take 1 tablet (500 mg total) by mouth every 12 (twelve) hours.    ERGOCALCIFEROL (ERGOCALCIFEROL) 50,000 UNIT CAP    Take 1 capsule (50,000 Units total) by mouth twice a week.    FREESTYLE LANCETS 28 GAUGE LANCETS    Apply topically.    LANCETS MISC    To check BG  one times daily, to use with insurance preferred meter    MEDROXYPROGESTERONE (PROVERA) 10 MG TABLET    Take 1 tablet by mouth once daily at 6am. 14 days q month    VALACYCLOVIR (VALTREX) 1000 MG TABLET    Take 1 tablet (1,000 mg total) by mouth  every 12 (twelve) hours.   Discontinued Medications    METFORMIN (GLUCOPHAGE-XR) 500 MG ER 24HR TABLET    Take 2 tablets (1,000 mg total) by mouth every evening.    TIRZEPATIDE (MOUNJARO) 5 MG/0.5 ML PNIJ    Inject 5 mg into the skin every 7 days.

## 2024-11-18 NOTE — TELEPHONE ENCOUNTER
----- Message from Rita sent at 11/18/2024  3:28 PM CST -----  .Who Called: Joana Gallo        Preferred Method of Contact: Phone Call  Patient's Preferred Phone Number on File: [phone]   Best Call Back Number, if different: [phone]  Additional Information:   Disp Refills Start End MELISSA  tirzepatide 7.5 mg/0.5 mL PnIj  [facility] needs clarification zepbound or mounjaro?

## 2024-11-20 ENCOUNTER — TELEPHONE (OUTPATIENT)
Dept: INTERNAL MEDICINE | Facility: CLINIC | Age: 29
End: 2024-11-20
Payer: COMMERCIAL

## 2024-11-20 DIAGNOSIS — H92.09 OTALGIA, UNSPECIFIED LATERALITY: Primary | ICD-10-CM

## 2024-11-20 RX ORDER — CIPROFLOXACIN 0.5 MG/.25ML
4 SOLUTION/ DROPS AURICULAR (OTIC) 2 TIMES DAILY
Qty: 14 EACH | Refills: 1 | Status: SHIPPED | OUTPATIENT
Start: 2024-11-20 | End: 2024-11-27

## 2024-11-20 NOTE — TELEPHONE ENCOUNTER
----- Message from Alexi sent at 11/20/2024  1:48 PM CST -----  .Type:  Needs Medical Advice    Who Called: Joana   Symptoms (please be specific):    How long has patient had these symptoms:    Pharmacy name and phone #:  [facility] in [place]   Would the patient rather a call back or a response via [facility]?   Best Call Back Number: [phone]  Additional Information: Patient requested to speak with the nurse re: She needs more eardrops called in and please call her back with any questions.

## 2024-11-25 ENCOUNTER — TELEPHONE (OUTPATIENT)
Dept: INTERNAL MEDICINE | Facility: CLINIC | Age: 29
End: 2024-11-25
Payer: COMMERCIAL

## 2024-11-25 NOTE — TELEPHONE ENCOUNTER
----- Message from Poonam sent at 11/25/2024  1:38 PM CST -----  .Type:  Patient Returning Call    Who Called:pt  Who Left Message for Patient:pt  Does the patient know what this is regarding?:call back  Would the patient rather a call back or a response via [facility]?   Best Call Back Number:[phone] Please leave a detail message  Additional Information: Please call back about her ear still stuffy. Medication not working.

## 2024-11-26 PROBLEM — H66.004 RECURRENT ACUTE SUPPURATIVE OTITIS MEDIA OF RIGHT EAR WITHOUT SPONTANEOUS RUPTURE OF TYMPANIC MEMBRANE: Status: ACTIVE | Noted: 2024-11-26

## 2024-11-26 NOTE — ASSESSMENT & PLAN NOTE
Lab Results   Component Value Date     HGBA1C 8.9 (H) 09/13/2024      Acute on chronic   -recently placed back onto Mounjaro 5 mg weekly , however reports home blood sugars in the 140s range   -increase Mounjaro to 7.5 mg weekly  -also on metformin for PCOS and diabetes, discontinue since increasing Mounjaro  -stressed importance of diabetic diet and medication compliance  -A1c for next visit

## 2024-11-26 NOTE — ASSESSMENT & PLAN NOTE
-recurrent   -previously treated with Augmentin   -IM Rocephin in office   -initiate doxycycline  -encouraged to utilize OTC antihistamine  -history of recurrent ear infections as a child  -referred to ENT for further evaluation

## 2024-12-09 ENCOUNTER — TELEPHONE (OUTPATIENT)
Dept: INTERNAL MEDICINE | Facility: CLINIC | Age: 29
End: 2024-12-09
Payer: COMMERCIAL

## 2024-12-19 ENCOUNTER — TELEPHONE (OUTPATIENT)
Dept: INTERNAL MEDICINE | Facility: CLINIC | Age: 29
End: 2024-12-19
Payer: COMMERCIAL

## 2024-12-27 ENCOUNTER — LAB VISIT (OUTPATIENT)
Dept: LAB | Facility: HOSPITAL | Age: 29
End: 2024-12-27
Attending: INTERNAL MEDICINE
Payer: COMMERCIAL

## 2024-12-27 ENCOUNTER — TELEPHONE (OUTPATIENT)
Dept: INTERNAL MEDICINE | Facility: CLINIC | Age: 29
End: 2024-12-27

## 2024-12-27 ENCOUNTER — OFFICE VISIT (OUTPATIENT)
Dept: INTERNAL MEDICINE | Facility: CLINIC | Age: 29
End: 2024-12-27
Payer: COMMERCIAL

## 2024-12-27 VITALS
BODY MASS INDEX: 39.68 KG/M2 | OXYGEN SATURATION: 98 % | SYSTOLIC BLOOD PRESSURE: 124 MMHG | DIASTOLIC BLOOD PRESSURE: 76 MMHG | HEART RATE: 94 BPM | WEIGHT: 293 LBS | HEIGHT: 72 IN

## 2024-12-27 DIAGNOSIS — F41.9 ANXIETY: Chronic | ICD-10-CM

## 2024-12-27 DIAGNOSIS — A60.04 HERPES SIMPLEX VULVOVAGINITIS: Chronic | ICD-10-CM

## 2024-12-27 DIAGNOSIS — E66.01 CLASS 3 SEVERE OBESITY DUE TO EXCESS CALORIES WITH SERIOUS COMORBIDITY AND BODY MASS INDEX (BMI) OF 45.0 TO 49.9 IN ADULT: Chronic | ICD-10-CM

## 2024-12-27 DIAGNOSIS — K58.9 IRRITABLE BOWEL SYNDROME, UNSPECIFIED TYPE: Chronic | ICD-10-CM

## 2024-12-27 DIAGNOSIS — Z13.5 DIABETIC RETINOPATHY SCREENING: ICD-10-CM

## 2024-12-27 DIAGNOSIS — E28.2 PCOS (POLYCYSTIC OVARIAN SYNDROME): ICD-10-CM

## 2024-12-27 DIAGNOSIS — E11.65 TYPE 2 DIABETES MELLITUS WITH HYPERGLYCEMIA, WITHOUT LONG-TERM CURRENT USE OF INSULIN: Chronic | ICD-10-CM

## 2024-12-27 DIAGNOSIS — R73.09 HEMOGLOBIN A1C GREATER THAN 8.0%: ICD-10-CM

## 2024-12-27 DIAGNOSIS — E55.9 VITAMIN D INSUFFICIENCY: ICD-10-CM

## 2024-12-27 DIAGNOSIS — E11.65 UNCONTROLLED DIABETES MELLITUS WITH HYPERGLYCEMIA, WITHOUT LONG-TERM CURRENT USE OF INSULIN: ICD-10-CM

## 2024-12-27 DIAGNOSIS — E66.813 CLASS 3 SEVERE OBESITY DUE TO EXCESS CALORIES WITH SERIOUS COMORBIDITY AND BODY MASS INDEX (BMI) OF 45.0 TO 49.9 IN ADULT: Chronic | ICD-10-CM

## 2024-12-27 DIAGNOSIS — E55.9 VITAMIN D INSUFFICIENCY: Chronic | ICD-10-CM

## 2024-12-27 DIAGNOSIS — Z91.89 CARDIOVASCULAR RISK FACTOR: ICD-10-CM

## 2024-12-27 DIAGNOSIS — Z20.2 STD EXPOSURE: ICD-10-CM

## 2024-12-27 DIAGNOSIS — E28.2 PCOS (POLYCYSTIC OVARIAN SYNDROME): Chronic | ICD-10-CM

## 2024-12-27 DIAGNOSIS — Z11.3 SCREEN FOR STD (SEXUALLY TRANSMITTED DISEASE): ICD-10-CM

## 2024-12-27 DIAGNOSIS — Z00.00 WELLNESS EXAMINATION: Primary | ICD-10-CM

## 2024-12-27 DIAGNOSIS — E66.01 CLASS 3 SEVERE OBESITY DUE TO EXCESS CALORIES WITH SERIOUS COMORBIDITY AND BODY MASS INDEX (BMI) OF 45.0 TO 49.9 IN ADULT: ICD-10-CM

## 2024-12-27 DIAGNOSIS — E66.813 CLASS 3 SEVERE OBESITY DUE TO EXCESS CALORIES WITH SERIOUS COMORBIDITY AND BODY MASS INDEX (BMI) OF 45.0 TO 49.9 IN ADULT: ICD-10-CM

## 2024-12-27 PROBLEM — R05.9 COUGH: Status: RESOLVED | Noted: 2024-09-27 | Resolved: 2024-12-27

## 2024-12-27 PROBLEM — R30.0 DYSURIA: Status: RESOLVED | Noted: 2023-07-06 | Resolved: 2024-12-27

## 2024-12-27 PROBLEM — H66.004 RECURRENT ACUTE SUPPURATIVE OTITIS MEDIA OF RIGHT EAR WITHOUT SPONTANEOUS RUPTURE OF TYMPANIC MEMBRANE: Status: RESOLVED | Noted: 2024-11-26 | Resolved: 2024-12-27

## 2024-12-27 PROBLEM — R45.89 NEED FOR EMOTIONAL SUPPORT: Status: RESOLVED | Noted: 2023-04-27 | Resolved: 2024-12-27

## 2024-12-27 PROBLEM — H66.012 NON-RECURRENT ACUTE SUPPURATIVE OTITIS MEDIA OF LEFT EAR WITH SPONTANEOUS RUPTURE OF TYMPANIC MEMBRANE: Status: RESOLVED | Noted: 2024-09-27 | Resolved: 2024-12-27

## 2024-12-27 LAB
25(OH)D3+25(OH)D2 SERPL-MCNC: 19 NG/ML (ref 30–80)
ALBUMIN SERPL-MCNC: 3.7 G/DL (ref 3.5–5)
ALBUMIN/GLOB SERPL: 1.2 RATIO (ref 1.1–2)
ALP SERPL-CCNC: 51 UNIT/L (ref 40–150)
ALT SERPL-CCNC: 11 UNIT/L (ref 0–55)
ANION GAP SERPL CALC-SCNC: 6 MEQ/L
AST SERPL-CCNC: 11 UNIT/L (ref 5–34)
BASOPHILS # BLD AUTO: 0.04 X10(3)/MCL
BASOPHILS NFR BLD AUTO: 0.7 %
BILIRUB SERPL-MCNC: 0.9 MG/DL
BUN SERPL-MCNC: 9.8 MG/DL (ref 7–18.7)
C TRACH DNA SPEC QL NAA+PROBE: NOT DETECTED
CALCIUM SERPL-MCNC: 9.1 MG/DL (ref 8.4–10.2)
CHLORIDE SERPL-SCNC: 107 MMOL/L (ref 98–107)
CHOLEST SERPL-MCNC: 147 MG/DL
CHOLEST/HDLC SERPL: 4 {RATIO} (ref 0–5)
CO2 SERPL-SCNC: 24 MMOL/L (ref 22–29)
CREAT SERPL-MCNC: 0.69 MG/DL (ref 0.55–1.02)
CREAT UR-MCNC: 151.4 MG/DL (ref 45–106)
CREAT/UREA NIT SERPL: 14
EOSINOPHIL # BLD AUTO: 0.08 X10(3)/MCL (ref 0–0.9)
EOSINOPHIL NFR BLD AUTO: 1.3 %
ERYTHROCYTE [DISTWIDTH] IN BLOOD BY AUTOMATED COUNT: 13.5 % (ref 11.5–17)
EST. AVERAGE GLUCOSE BLD GHB EST-MCNC: 148.5 MG/DL
GFR SERPLBLD CREATININE-BSD FMLA CKD-EPI: >60 ML/MIN/1.73/M2
GLOBULIN SER-MCNC: 3 GM/DL (ref 2.4–3.5)
GLUCOSE SERPL-MCNC: 155 MG/DL (ref 74–100)
HAV IGM SERPL QL IA: NONREACTIVE
HBA1C MFR BLD: 6.8 %
HBV CORE IGM SERPL QL IA: NONREACTIVE
HBV SURFACE AG SERPL QL IA: NONREACTIVE
HCT VFR BLD AUTO: 38.9 % (ref 37–47)
HCV AB SERPL QL IA: NONREACTIVE
HDLC SERPL-MCNC: 34 MG/DL (ref 35–60)
HGB BLD-MCNC: 12.5 G/DL (ref 12–16)
IMM GRANULOCYTES # BLD AUTO: 0.01 X10(3)/MCL (ref 0–0.04)
IMM GRANULOCYTES NFR BLD AUTO: 0.2 %
LDLC SERPL CALC-MCNC: 89 MG/DL (ref 50–140)
LYMPHOCYTES # BLD AUTO: 2.28 X10(3)/MCL (ref 0.6–4.6)
LYMPHOCYTES NFR BLD AUTO: 38.4 %
MCH RBC QN AUTO: 29.3 PG (ref 27–31)
MCHC RBC AUTO-ENTMCNC: 32.1 G/DL (ref 33–36)
MCV RBC AUTO: 91.1 FL (ref 80–94)
MICROALBUMIN UR-MCNC: 14.1 UG/ML
MICROALBUMIN/CREAT RATIO PNL UR: 9.3 MG/GM CR (ref 0–30)
MONOCYTES # BLD AUTO: 0.33 X10(3)/MCL (ref 0.1–1.3)
MONOCYTES NFR BLD AUTO: 5.6 %
N GONORRHOEA DNA SPEC QL NAA+PROBE: NOT DETECTED
NEUTROPHILS # BLD AUTO: 3.2 X10(3)/MCL (ref 2.1–9.2)
NEUTROPHILS NFR BLD AUTO: 53.8 %
NRBC BLD AUTO-RTO: 0 %
PLATELET # BLD AUTO: 251 X10(3)/MCL (ref 130–400)
PMV BLD AUTO: 10.2 FL (ref 7.4–10.4)
POTASSIUM SERPL-SCNC: 4.1 MMOL/L (ref 3.5–5.1)
PROT SERPL-MCNC: 6.7 GM/DL (ref 6.4–8.3)
RBC # BLD AUTO: 4.27 X10(6)/MCL (ref 4.2–5.4)
SODIUM SERPL-SCNC: 137 MMOL/L (ref 136–145)
SOURCE (OHS): NORMAL
T PALLIDUM AB SER QL: NONREACTIVE
TRIGL SERPL-MCNC: 122 MG/DL (ref 37–140)
TSH SERPL-ACNC: 2.2 UIU/ML (ref 0.35–4.94)
VLDLC SERPL CALC-MCNC: 24 MG/DL
WBC # BLD AUTO: 5.94 X10(3)/MCL (ref 4.5–11.5)

## 2024-12-27 PROCEDURE — 82570 ASSAY OF URINE CREATININE: CPT

## 2024-12-27 PROCEDURE — 83036 HEMOGLOBIN GLYCOSYLATED A1C: CPT

## 2024-12-27 PROCEDURE — 82306 VITAMIN D 25 HYDROXY: CPT

## 2024-12-27 PROCEDURE — 36415 COLL VENOUS BLD VENIPUNCTURE: CPT

## 2024-12-27 PROCEDURE — 86780 TREPONEMA PALLIDUM: CPT

## 2024-12-27 PROCEDURE — 80061 LIPID PANEL: CPT

## 2024-12-27 PROCEDURE — 87591 N.GONORRHOEAE DNA AMP PROB: CPT

## 2024-12-27 PROCEDURE — 80053 COMPREHEN METABOLIC PANEL: CPT

## 2024-12-27 PROCEDURE — 80074 ACUTE HEPATITIS PANEL: CPT

## 2024-12-27 PROCEDURE — 84443 ASSAY THYROID STIM HORMONE: CPT

## 2024-12-27 PROCEDURE — 85025 COMPLETE CBC W/AUTO DIFF WBC: CPT

## 2024-12-27 RX ORDER — ERGOCALCIFEROL 1.25 MG/1
50000 CAPSULE ORAL
Qty: 24 CAPSULE | Refills: 1 | Status: SHIPPED | OUTPATIENT
Start: 2024-12-30 | End: 2025-06-28

## 2024-12-27 RX ORDER — PROPRANOLOL HYDROCHLORIDE 60 MG/1
60 CAPSULE, EXTENDED RELEASE ORAL
Qty: 30 CAPSULE | Refills: 11 | Status: SHIPPED | OUTPATIENT
Start: 2024-12-27 | End: 2025-12-27

## 2024-12-27 RX ORDER — ERGOCALCIFEROL 1.25 MG/1
50000 CAPSULE ORAL
Qty: 24 CAPSULE | Refills: 1 | Status: SHIPPED | OUTPATIENT
Start: 2024-12-30 | End: 2024-12-27

## 2024-12-27 NOTE — ASSESSMENT & PLAN NOTE
Estimated body mass index is 48.42 kg/m² as calculated from the following:    Height as of this encounter: 6' (1.829 m).    Weight as of this encounter: 161.9 kg (357 lb).   -encourage low-calorie low carb diet   -encourage some form of routine aerobic exercise

## 2024-12-27 NOTE — ASSESSMENT & PLAN NOTE
-acute on chronic   -discussed daily medication trial, however hesitant since wishing to complicate fertility issues   -initiate trial on propranolol  since safe during pregnancy

## 2024-12-27 NOTE — ASSESSMENT & PLAN NOTE
-patient feeling generally well today  -wellness labs reviewed and generally stable, elevated A1c  -age-appropriate screenings up-to-date  -immunizations discussed reviewed and discussed  -encourage routine aerobic exercise 2 to 3 times a week  -increase fluid hydration

## 2024-12-27 NOTE — ASSESSMENT & PLAN NOTE
-diabetes much improved  -working on weight loss with Mounjaro   -established with gyn, follow   -no longer on metformin due to use of Mounjaro

## 2024-12-27 NOTE — TELEPHONE ENCOUNTER
----- Message from Poonam sent at 12/27/2024 11:03 AM CST -----  .Type:  Patient Returning Call    Who Called:Justina with [facility]  Who Left Message for Patient:Justina   Does the patient know what this is regarding?:Clarification on tirzepatide 10 mg/0.5 mL PnIj  Would the patient rather a call back or a response via [facility]?   Best Call Back Number:[phone]  Additional Information: Please call back with Clarification on tirzepatide 10 mg/0.5 mL PnIj. Called back line phone hung up

## 2024-12-27 NOTE — PROGRESS NOTES
Patient ID: Joana Gallo is a 29 y.o. female.    Chief Complaint: Annual Exam (Annual wellness-No complaints or concerns )      Joana Gallo is a 29 y.o. female, known to Dr Vega, presents today for a wellness visit.  Medical comorbidities include  DM 2, PCOS, morbid obesity, vitamin-D insufficiency, and genital herpes on chronic suppression.  Since last visit patient reports he has been fairly well without acute injury or major illness.  Was recently seen for some recurrent ear infections, however reports now resolved.  Most recent wellness labs reviewed in generally stable.  HGB A1c 6.8, much improved from prior 8.9.  Currently on Mounjaro 7.5 mg.  BMI 48 for which we discussed increasing Mounjaro from 7.5-10 mg for enhance weight loss as well as glycemic control.  Vitamin-D 19 and previously prescribed 23420 units twice weekly.  Reports difficulty adhering to twice weekly dosing.  Patient does report some increased anxiety over the past 3-4 months.  Mostly related to familial/work circumstances.  Wishing to discuss pharmacologic intervention, however concerned due to trouble with fertility and not wishing to be placed on any medications that would interact.  Discussed trial on propranolol to aid with anxiety, for which patient was in agreeance.  Otherwise denies any other acute medical concerns.     Wellness:  12/27/2024          MEDICAL HISTORY:    Past Medical History:   Diagnosis Date    Allergy     Genital herpes     Irritable bowel syndrome, unspecified type     Morbid obesity due to excess calories     PCOS (polycystic ovarian syndrome)     Type 2 diabetes mellitus with hyperglycemia, without long-term current use of insulin     Vitamin D insufficiency       History reviewed. No pertinent surgical history.   Social History     Tobacco Use    Smoking status: Never    Smokeless tobacco: Never   Substance Use Topics    Alcohol use: Yes     Comment: once a month    Drug use: Never           Health Maintenance Due   Topic Date Due    Foot Exam  Never done    TETANUS VACCINE  Never done    Pneumococcal Vaccines (Age 0-49) (1 of 2 - PCV) Never done    Eye Exam  11/25/2023    COVID-19 Vaccine (3 - 2024-25 season) 09/01/2024          Patient Care Team:  Paz Vega MD as PCP - General (Internal Medicine)  Shashank Blake OD (Optometry)  Caren Robertson LPN as Care Coordinator  Michelle Sheridan MD (Obstetrics and Gynecology)      Review of Systems   Constitutional:  Negative for fatigue and fever.   HENT:  Negative for congestion, rhinorrhea, sore throat and trouble swallowing.    Eyes:  Negative for redness and visual disturbance.   Respiratory:  Negative for cough, chest tightness and shortness of breath.    Cardiovascular:  Negative for chest pain and palpitations.   Gastrointestinal:  Negative for abdominal pain, constipation, diarrhea, nausea and vomiting.   Genitourinary:  Negative for dysuria, flank pain, frequency and urgency.   Musculoskeletal:  Negative for arthralgias, gait problem and myalgias.   Skin:  Negative for rash and wound.   Neurological:  Negative for facial asymmetry, speech difficulty, weakness and headaches.   Psychiatric/Behavioral:  The patient is nervous/anxious.    All other systems reviewed and are negative.      Objective:   /76 (BP Location: Left arm, Patient Position: Sitting)   Pulse 94   Ht 6' (1.829 m)   Wt (!) 161.9 kg (357 lb)   LMP 11/27/2024   SpO2 98%   BMI 48.42 kg/m²      Physical Exam  Constitutional:       General: She is not in acute distress.     Appearance: Normal appearance. She is obese.   HENT:      Right Ear: Tympanic membrane, ear canal and external ear normal.      Left Ear: Tympanic membrane, ear canal and external ear normal.      Nose: Nose normal.      Mouth/Throat:      Mouth: Mucous membranes are moist.      Pharynx: Oropharynx is clear.   Eyes:      Extraocular Movements: Extraocular movements intact.       Conjunctiva/sclera: Conjunctivae normal.      Pupils: Pupils are equal, round, and reactive to light.   Cardiovascular:      Rate and Rhythm: Normal rate and regular rhythm.      Pulses: Normal pulses.      Heart sounds: Normal heart sounds. No murmur heard.     No gallop.   Pulmonary:      Effort: Pulmonary effort is normal.      Breath sounds: Normal breath sounds. No wheezing.   Abdominal:      General: Bowel sounds are normal. There is no distension.      Palpations: Abdomen is soft. There is no mass.      Tenderness: There is no abdominal tenderness. There is no guarding.   Musculoskeletal:         General: Normal range of motion.   Skin:     General: Skin is warm and dry.   Neurological:      Mental Status: She is alert. Mental status is at baseline.      Sensory: No sensory deficit.      Motor: No weakness.             Assessment:       ICD-10-CM ICD-9-CM   1. Wellness examination  Z00.00 V70.0   2. PCOS (polycystic ovarian syndrome)  E28.2 256.4   3. Diabetic retinopathy screening  Z13.5 V80.2   4. Type 2 diabetes mellitus with hyperglycemia, without long-term current use of insulin  E11.65 250.00     790.29   5. Anxiety  F41.9 300.00   6. Vitamin D insufficiency  E55.9 268.9   7. Herpes simplex vulvovaginitis  A60.04 054.11   8. Class 3 severe obesity due to excess calories with serious comorbidity and body mass index (BMI) of 45.0 to 49.9 in adult  E66.813 278.01    Z68.42 V85.42    E66.01    9. Irritable bowel syndrome, unspecified type  K58.9 564.1        Plan:     Problem List Items Addressed This Visit          Psychiatric    Anxiety (Chronic)     -acute on chronic   -discussed daily medication trial, however hesitant since wishing to complicate fertility issues   -initiate trial on propranolol  since safe during pregnancy         Relevant Medications    propranoloL (INDERAL LA) 60 MG 24 hr capsule    Other Relevant Orders    Comprehensive Metabolic Panel       Ophtho    Diabetic retinopathy screening      -patient reports up-to-date  -request records from [facility]             ID    Herpes simplex vulvovaginitis (Chronic)     -currently not on any chronic suppressive therapy   -educated on importance of safe sex practices                Endocrine    PCOS (polycystic ovarian syndrome) (Chronic)     -diabetes much improved  -working on weight loss with Mounjaro   -established with gyn, follow   -no longer on metformin due to use of Mounjaro         Class 3 severe obesity due to excess calories with serious comorbidity and body mass index (BMI) of 45.0 to 49.9 in adult (Chronic)     Estimated body mass index is 48.42 kg/m² as calculated from the following:    Height as of this encounter: 6' (1.829 m).    Weight as of this encounter: 161.9 kg (357 lb).   -encourage low-calorie low carb diet   -encourage some form of routine aerobic exercise           Vitamin D insufficiency (Chronic)     -acute on chronic  -previously on vitamin-D 97570 units twice weekly however no longer taking, re-initiate on              Relevant Medications    ergocalciferol (ERGOCALCIFEROL) 50,000 unit Cap (Start on 12/30/2024)    Uncontrolled diabetes mellitus with hyperglycemia, without long-term current use of insulin    Relevant Medications    tirzepatide 10 mg/0.5 mL PnIj       GI    Irritable bowel syndrome (Chronic)     -stable   -encourage high-fiber diet   -increase fluid hydration            Other    Wellness examination - Primary     -patient feeling generally well today  -wellness labs reviewed and generally stable, elevated A1c  -age-appropriate screenings up-to-date  -immunizations discussed reviewed and discussed  -encourage routine aerobic exercise 2 to 3 times a week  -increase fluid hydration               Follow up in about 4 months (around 4/27/2025) for Diabetes.   -plan specifics discussed above    Orders Placed This Encounter    Comprehensive Metabolic Panel    Hemoglobin A1C    tirzepatide 10 mg/0.5 mL PnIj     ergocalciferol (ERGOCALCIFEROL) 50,000 unit Cap    propranoloL (INDERAL LA) 60 MG 24 hr capsule        Medication List with Changes/Refills   New Medications    PROPRANOLOL (INDERAL LA) 60 MG 24 HR CAPSULE    Take 1 capsule (60 mg total) by mouth daily with dinner or evening meal.    TIRZEPATIDE 10 MG/0.5 ML PNIJ    Inject 10 mg into the skin every 7 days.   Current Medications    BLOOD SUGAR DIAGNOSTIC STRP    To check BG one times daily, to use with insurance preferred meter    BLOOD-GLUCOSE METER KIT    To check BG one times daily, to use with insurance preferred meter    FREESTYLE LANCETS 28 GAUGE LANCETS    Apply topically.    LANCETS MISC    To check BG  one times daily, to use with insurance preferred meter    VALACYCLOVIR (VALTREX) 1000 MG TABLET    Take 1 tablet (1,000 mg total) by mouth every 12 (twelve) hours.   Changed and/or Refilled Medications    Modified Medication Previous Medication    ERGOCALCIFEROL (ERGOCALCIFEROL) 50,000 UNIT CAP ergocalciferol (ERGOCALCIFEROL) 50,000 unit Cap       Take 1 capsule (50,000 Units total) by mouth twice a week.    Take 1 capsule (50,000 Units total) by mouth twice a week.   Discontinued Medications    CIPROFLOXACIN HCL (CIPRO) 500 MG TABLET    Take 1 tablet (500 mg total) by mouth every 12 (twelve) hours.    MEDROXYPROGESTERONE (PROVERA) 10 MG TABLET    Take 1 tablet by mouth once daily at 6am. 14 days q month    TIRZEPATIDE 7.5 MG/0.5 ML PNIJ    Inject 7.5 mg into the skin every 7 days.

## 2024-12-27 NOTE — Clinical Note
-Patient reports diabetic eye exam up-to-date.  Please request most recent exam from [facility] [address] -patient reports HIV screen up-to-date.  Please request from  Dr Arvin CASTRO ([facility] in [place])

## 2024-12-27 NOTE — ASSESSMENT & PLAN NOTE
-acute on chronic  -previously on vitamin-D 75578 units twice weekly however no longer taking, re-initiate on

## 2024-12-27 NOTE — ASSESSMENT & PLAN NOTE
-currently not on any chronic suppressive therapy   -educated on importance of safe sex practices

## 2024-12-30 ENCOUNTER — TELEPHONE (OUTPATIENT)
Dept: INTERNAL MEDICINE | Facility: CLINIC | Age: 29
End: 2024-12-30
Payer: COMMERCIAL

## 2024-12-30 NOTE — TELEPHONE ENCOUNTER
----- Message from BEKA Avalos sent at 12/27/2024 12:00 PM CST -----  -Patient reports diabetic eye exam up-to-date.  Please request most recent exam from [facility]  -patient reports HIV screen up-to-date.  Please request from  Dr Arvin CASTRO ([facility] in [place])

## 2024-12-31 LAB — PATH REV: NORMAL

## 2025-02-11 ENCOUNTER — TELEPHONE (OUTPATIENT)
Dept: INTERNAL MEDICINE | Facility: CLINIC | Age: 30
End: 2025-02-11
Payer: COMMERCIAL

## 2025-02-11 DIAGNOSIS — A60.04 HERPES SIMPLEX VULVOVAGINITIS: ICD-10-CM

## 2025-02-11 RX ORDER — VALACYCLOVIR HYDROCHLORIDE 1 G/1
1000 TABLET, FILM COATED ORAL EVERY 12 HOURS
Qty: 20 TABLET | Refills: 3 | Status: SHIPPED | OUTPATIENT
Start: 2025-02-11 | End: 2025-03-23

## 2025-02-11 NOTE — TELEPHONE ENCOUNTER
----- Message from Kasia sent at 2/11/2025 10:32 AM CST -----  Regarding: refill  Who Called: Joana Gallo    Refill or New Rx:Refill  RX Name and Strength:valACYclovir (VALTREX) 1000 MG tablet    How is the patient currently taking it? (ex. 1XDay):  Is this a 30 day or 90 day RX:  Local or Mail Order:    List of preferred pharmacies on file (remove unneeded): [facility] [address] [place] [address]      If different Pharmacy is requested, enter Pharmacy information here including location and phone number:    Ordering Provider:      Preferred Method of Contact: Phone Call    Patient's Preferred Phone Number on File: [phone]     Best Call Back Number, if different:  Additional Information:

## 2025-03-10 ENCOUNTER — PATIENT OUTREACH (OUTPATIENT)
Facility: CLINIC | Age: 30
End: 2025-03-10
Payer: COMMERCIAL

## 2025-03-10 NOTE — LETTER
,      AUTHORIZATION FOR RELEASE OF CONFIDENTIAL INFORMATION      03/10/2025      Dear [facility],    We are seeing Joana Gallo, date of birth 1995, in the clinic at [place] [address] [facility].  Paz Vega MD is the patient's PCP. Joana Gallo has an outstanding lab/procedure at the time we reviewed his chart.  In order to help keep her health information updated, Joana has authorized us to request the following medical record(s):        Eye Exam - including evaluation for diabetic retinopathy         Please fax any records to Paz Vega MD's at  [phone]    If you have any questions, please contact  Joseph BARRIENTOS,CCC @ [phone]             Patient Name: Joana Gallo  :1995  Patient Phone #:[phone]            Joana Gallo  MRN: [id number]  : 1995  Age: 28 y.o.  Sex: female         Patient/Legal Guardian Signature  This signature was collected at 2023    Joana Gallo     Self  _______________________________   Printed Name/Relationship to Patient      Consent for Examination and Treatment: I hereby authorize the providers and employees of [facility] ([facility]) to provide medical treatment/services which includes, but is not limited to, performing and administering tests and diagnostic procedures that are deemed necessary, including, but not limited to, imaging examinations, blood tests and other laboratory procedures as may be required by the hospital, clinic, or may be ordered by my physician(s) or persons working under the general and/or special instructions of my physician(s).      I understand and agree that this consent covers all authorized persons, including but not limited to physicians, residents, nurse practitioners, physicians' assistants, specialists, consultants, student nurses, and independently contracted physicians, who are called upon by the physician in charge, to carry out the diagnostic procedures and  medical or surgical treatment.     I hereby authorize [facility] to retain or dispose of any specimens or tissue, should there be such remaining from any test or procedure.     I hereby authorize and give consent for [facility] providers and employees to take photographs, images or videotapes of such diagnostic, surgical or treatment procedures of Patient as may be required by [facility] or as may be ordered by a physician. I further acknowledge and agree that [facility] may use cameras or other devices for patient monitoring.     I am aware that the practice of medicine is not an exact science, and I acknowledge that no guarantees have been made to me as to the outcome of any tests, procedures or treatment.     Authorization for Release of Information: I understand that my insurance company and/or their agents may need information necessary to make determinations about payment/reimbursement. I hereby provide authorization to release to all insurance companies, their successors, assignees, other parties with whom they may have contracted, or others acting on their behalf, that are involved with payment for any hospital and/or clinic charges incurred by the patient, any information that they request and deem necessary for payment/reimbursement, and/or quality review.  I further authorize the release of my health information to physicians or other health care practitioners on staff who are involved in my health care now and in the future, and to other health care providers, entities, or institutions for the purpose of my continued care and treatment, including referrals.     REGISTRATION AUTHORIZATION  Form No. 87939 (Rev. 7/13/2022)       Medicare Patient's Certification and Authorization to Release Information and Payment Request:  I certify that the information given by me in applying for payment under Title XVIII of the Social Security Act is correct. I authorize any tijerina of medical or other information about me to release  to the [facility], or its intermediaries or carriers, any information needed for this or a related Medicare claim. I request that payment of authorized benefits be made on my behalf.     Assignment of Insurance Benefits:   I hereby authorize any and all insurance companies, health plans, defined   benefit plans, health insurers or any entity that is or may be responsible for payment of my medical expenses to pay all hospital and medical benefits now due, and to become due and payable to me under any hospital benefits, sick benefits, injury benefits or any other benefit for services rendered to me, including Major Medical Benefits, direct to [facility] and all independently contracted physicians. I assign any and all rights that I may have against any and all insurance companies, health plans, defined benefit plans, health insurers or any entity that is or may be responsible for payment of my medical expenses, including, but not limited to any right to appeal a denial of a claim, any right to bring any action, lawsuit, administrative proceeding, or other cause of action on my behalf. I specifically assign my right to pursue litigation against any and all insurance companies, health plans, defined benefit plans, health insurers or any entity that is or may be responsible for payment of my medical expenses based upon a refusal to pay charges.            E. Valuables: It is understood and agreed that [facility] is not liable for the damage to or loss of any money, jewelry,   documents, dentures, eye glasses, hearing aids, prosthetics, or other property of value.     F. Computer Equipment: I understand and agree that should I choose to use computer equipment owned by [facility] or if I choose to access the Internet via [facility]s network, I do so at my own risk. [facility] is not responsible for any damage to my computer equipment or to any damages of any type that might arise from my loss of equipment or data.      ADILENE. Acceptance of Financial Responsibility:  I agree that in consideration of the services and   supplies that have been   or will be furnished to the patient, I am hereby obligated to pay all charges made for or on the account of the patient according to the standard rates (in effect at the time the services and supplies are delivered) established by [facility], including its Patient Financial Assistance Policy to the extent it is applicable. I understand that I am responsible for all charges, or portions thereof, not covered by insurance or other sources. Patient refunds will be distributed only after balances at all [facility] facilities are paid.     H. Communication Authorization:  I hereby authorize [facility] and its representatives, along with any billing service   or  who may work on their behalf, to contact me on   my cell phone and/or home phone using pre- recorded messages, artificial voice messages, automatic telephone dialing devices or other computer assisted technology, or by electronic      mail, text messaging, or by any other form of electronic communication. This includes, but is not limited to, appointment reminders, yearly physical exam reminders, preventive care reminders, patient campaigns, welcome calls, and calls about account balances on my account or any account on which I am listed as a guarantor. I understand I have the right to opt out of these communications at any time.      Relationship  Between  Facility and  Provider:      I understand that some, but not all, providers furnishing services to the patient are not employees or agents of [facility]. The patient is under the care and supervision of his/her attending physician, and it is the responsibility of the facility and its nursing staff to carry out the instructions of such physicians. It is the responsibility of the patient's physician/designee to obtain the patient's informed consent, when required, for medical or surgical  treatment, special diagnostic or therapeutic procedures, or hospital services rendered for the patient under the special instructions of the physician/designee.     REGISTRATION AUTHORIZATION  Form No. 50756 (Rev. 7/13/2022)      Notice of Privacy Practices: I acknowledge I have received a copy of [facility]'s Notice of Privacy Practices.     Facility  Directory: I have discussed with the organization my desire to be either included or excluded  in the facility directory in the event of my being an inpatient at an [facility] facility. I understand that if my choice is to opt-out of being identified in the facility directory that the facility will not provide any information about me such as my condition (e.g. fair, stable, etc.) or my location in the facility (e.g., room number, department).     Immunizations: [facility] shares immunization information with state sponsored health departments to help you and your doctor keep track of your immunization records. By signing, you consent to have this information shared with the health department in your state:                                [place] - LINKS ([place] Immunization Network for Kids Statewide)                                [place] - MIIX ([place] Immunization Information eXchange)                                [place] - ImmPRINT (Immunization Patient Registry with Integrated Technology)     TERM: This authorization is valid for this and subsequent care/treatment I receive at [facility] and will remain valid unless/until revoked in writing by me.     [facility]: As used in this document, [facility] means all [facility] owned and managed facilities, including, but not limited to, all health centers, surgery centers, clinics, urgent care centers, and hospitals.         [facility] complies with applicable Federal civil rights laws and does not discriminate on the basis of race, color, national origin, age, disability, or sex.  ATENCIÓN:  si habla español, tiene a perez disposición servicios gratuitos de asistencia lingüística. Llame al [phone].  MANJU Ý: N?u b?n nói Ti?ng Vi?t, có các d?ch v? h? tr? ngôn ng? mi?n phí dành cho b?n. G?i s? [phone].

## 2025-03-11 NOTE — PROGRESS NOTES
Health Maintenance Topic(s) Outreach Outcomes & Actions Taken:    Eye Exam - Outreach Outcomes & Actions Taken  : External Records Requested & Care Team Updated if Applicable       Additional Notes:  Request Diabetic Eye Exam: MACKENZIE Laxmi [facility]         
Hyper-linked eye exam to .     
Alert and oriented to person, place and time

## 2025-03-24 ENCOUNTER — PATIENT OUTREACH (OUTPATIENT)
Facility: CLINIC | Age: 30
End: 2025-03-24
Payer: COMMERCIAL

## 2025-04-17 ENCOUNTER — TELEPHONE (OUTPATIENT)
Dept: INTERNAL MEDICINE | Facility: CLINIC | Age: 30
End: 2025-04-17
Payer: COMMERCIAL

## 2025-04-17 NOTE — TELEPHONE ENCOUNTER
----- Message from Nurse William sent at 4/3/2025  8:45 AM CDT -----  Regarding: PV for 4/24/25  Fasting labs for 4/24/25

## 2025-04-24 ENCOUNTER — LAB VISIT (OUTPATIENT)
Dept: LAB | Facility: HOSPITAL | Age: 30
End: 2025-04-24
Payer: COMMERCIAL

## 2025-04-24 ENCOUNTER — TELEPHONE (OUTPATIENT)
Dept: INTERNAL MEDICINE | Facility: CLINIC | Age: 30
End: 2025-04-24

## 2025-04-24 ENCOUNTER — OFFICE VISIT (OUTPATIENT)
Dept: INTERNAL MEDICINE | Facility: CLINIC | Age: 30
End: 2025-04-24
Payer: COMMERCIAL

## 2025-04-24 VITALS — BODY MASS INDEX: 39.68 KG/M2 | WEIGHT: 293 LBS | HEIGHT: 72 IN

## 2025-04-24 DIAGNOSIS — E55.9 VITAMIN D INSUFFICIENCY: Chronic | ICD-10-CM

## 2025-04-24 DIAGNOSIS — E11.65 TYPE 2 DIABETES MELLITUS WITH HYPERGLYCEMIA, WITHOUT LONG-TERM CURRENT USE OF INSULIN: Chronic | ICD-10-CM

## 2025-04-24 DIAGNOSIS — F41.9 ANXIETY: Chronic | ICD-10-CM

## 2025-04-24 DIAGNOSIS — E11.65 UNCONTROLLED DIABETES MELLITUS WITH HYPERGLYCEMIA, WITHOUT LONG-TERM CURRENT USE OF INSULIN: Primary | ICD-10-CM

## 2025-04-24 DIAGNOSIS — A60.04 HERPES SIMPLEX VULVOVAGINITIS: Chronic | ICD-10-CM

## 2025-04-24 LAB
ALBUMIN SERPL-MCNC: 3.8 G/DL (ref 3.5–5)
ALBUMIN/GLOB SERPL: 0.9 RATIO (ref 1.1–2)
ALP SERPL-CCNC: 54 UNIT/L (ref 40–150)
ALT SERPL-CCNC: 13 UNIT/L (ref 0–55)
ANION GAP SERPL CALC-SCNC: 7 MEQ/L
AST SERPL-CCNC: 14 UNIT/L (ref 11–45)
BILIRUB SERPL-MCNC: 0.4 MG/DL
BUN SERPL-MCNC: 11.5 MG/DL (ref 7–18.7)
CALCIUM SERPL-MCNC: 9.5 MG/DL (ref 8.4–10.2)
CHLORIDE SERPL-SCNC: 104 MMOL/L (ref 98–107)
CO2 SERPL-SCNC: 25 MMOL/L (ref 22–29)
CREAT SERPL-MCNC: 0.71 MG/DL (ref 0.55–1.02)
CREAT/UREA NIT SERPL: 16
EST. AVERAGE GLUCOSE BLD GHB EST-MCNC: 162.8 MG/DL
GFR SERPLBLD CREATININE-BSD FMLA CKD-EPI: >60 ML/MIN/1.73/M2
GLOBULIN SER-MCNC: 4.1 GM/DL (ref 2.4–3.5)
GLUCOSE SERPL-MCNC: 179 MG/DL (ref 74–100)
HBA1C MFR BLD: 7.3 %
POTASSIUM SERPL-SCNC: 4.1 MMOL/L (ref 3.5–5.1)
PROT SERPL-MCNC: 7.9 GM/DL (ref 6.4–8.3)
SODIUM SERPL-SCNC: 136 MMOL/L (ref 136–145)

## 2025-04-24 PROCEDURE — 80053 COMPREHEN METABOLIC PANEL: CPT

## 2025-04-24 PROCEDURE — 36415 COLL VENOUS BLD VENIPUNCTURE: CPT

## 2025-04-24 PROCEDURE — 83036 HEMOGLOBIN GLYCOSYLATED A1C: CPT

## 2025-04-24 RX ORDER — TIRZEPATIDE 12.5 MG/.5ML
12.5 INJECTION, SOLUTION SUBCUTANEOUS
Qty: 6 ML | Refills: 1 | Status: SHIPPED | OUTPATIENT
Start: 2025-04-24 | End: 2025-10-21

## 2025-04-24 NOTE — PROGRESS NOTES
Patient ID: Joana Gallo is a 30 y.o. female.    Chief Complaint: Follow-up (Follow up for Dm- no complaints )      HPI    History of Present Illness             Wellness:The patient doesn't have any registry metric data available     MEDICAL HISTORY:    Past Medical History:   Diagnosis Date    Allergy     Genital herpes     Irritable bowel syndrome, unspecified type     Morbid obesity due to excess calories     PCOS (polycystic ovarian syndrome)     Type 2 diabetes mellitus with hyperglycemia, without long-term current use of insulin     Vitamin D insufficiency       History reviewed. No pertinent surgical history.   Social History[1]       Health Maintenance Due   Topic Date Due    Foot Exam  Never done    Pneumococcal Vaccines (Age 0-49) (1 of 2 - PCV) Never done    TETANUS VACCINE  06/18/2019    COVID-19 Vaccine (3 - 2024-25 season) 09/01/2024          Patient Care Team:  Paz Vega MD as PCP - General (Internal Medicine)  Shashank Blake OD (Optometry)  Caren Robertson LPN as Care Coordinator  Michelle Sheridan MD (Obstetrics and Gynecology)      Review of Systems    Objective:   Ht 6' (1.829 m)   Wt (!) 161.9 kg (357 lb)   BMI 48.42 kg/m²      Physical Exam      Assessment:   No diagnosis found.     Plan:   {There are no diagnoses linked to this encounter. (Refresh or delete this SmartLink)}     Assessment & Plan                  No follow-ups on file.   -plan specifics discussed above          Medication List with Changes/Refills   Current Medications    BLOOD SUGAR DIAGNOSTIC STRP    To check BG one times daily, to use with insurance preferred meter    BLOOD-GLUCOSE METER KIT    To check BG one times daily, to use with insurance preferred meter    ERGOCALCIFEROL (ERGOCALCIFEROL) 50,000 UNIT CAP    Take 1 capsule (50,000 Units total) by mouth twice a week.    FREESTYLE LANCETS 28 GAUGE LANCETS    Apply topically.    LANCETS MISC    To check BG  one times daily, to use with insurance  preferred meter    PROPRANOLOL (INDERAL LA) 60 MG 24 HR CAPSULE    Take 1 capsule (60 mg total) by mouth daily with dinner or evening meal.    TIRZEPATIDE 10 MG/0.5 ML PNIJ    Inject 10 mg into the skin every 7 days.    VALACYCLOVIR (VALTREX) 1000 MG TABLET    Take 1 tablet (1,000 mg total) by mouth every 12 (twelve) hours.      This note was generated with the assistance of ambient listening technology. I attest to having reviewed and edited the generated note for accuracy, though some syntax or spelling errors may persist. Please contact the author of this note for any clarification.        [1]   Social History  Tobacco Use    Smoking status: Never    Smokeless tobacco: Never   Substance Use Topics    Alcohol use: Yes     Comment: once a month    Drug use: Never

## 2025-04-24 NOTE — Clinical Note
Follow up in about 3 months (around 7/24/2025) for MUST be with Dr. Mijares.  If visit is rescheduled, CAN'T be with NP.  MUST be w/ DR. MIJARES.

## 2025-04-24 NOTE — ASSESSMENT & PLAN NOTE
Lab Results   Component Value Date    HGBA1C 7.3 (H) 04/24/2025    GLUCOSE 179 (H) 04/24/2025    -uncontrolled  -worsened from prior 6.8.  -currently on Mounjaro 10 mg, increase to 12.5 mg weekly  -endorses some indulgent eating, stressed importance of adherence to diabetic diet  -low-carbohydrate diet   -encouraged to obtain diabetic eye exam yearly   -encouraged to obtain diabetic foot exam yearly

## 2025-04-24 NOTE — PROGRESS NOTES
Patient ID: Joana Gallo is a 30 y.o. female.    Chief Complaint: Follow-up (Follow up for Dm- no complaints )      The patient location is: Home  Visit type: audiovisual    Face to Face time with patient: 25 minutes  35 minutes of total time spent on the encounter, which includes face to face time and non-face to face time preparing to see the patient (eg, review of tests), Obtaining and/or reviewing separately obtained history, Documenting clinical information in the electronic or other health record, Independently interpreting results (not separately reported) and communicating results to the patient/family/caregiver, or Care coordination (not separately reported).     Each patient to whom he or she provides medical services by telemedicine is:  (1) informed of the relationship between the physician and patient and the respective role of any other health care provider with respect to management of the patient; and (2) notified that he or she may decline to receive medical services by telemedicine and may withdraw from such care at any time.    Joana Gallo is a 30 y.o. female, known to Dr Vega, presents today for a three-month diabetes follow up visit.  Medical comorbidities include  DM 2, PCOS, morbid obesity, vitamin-D insufficiency, and genital herpes on chronic suppression.    Since last visit patient reports he has been fairly well without acute injury or major illness.  Most recent HGB A1c 7.5, worsened from previous A1c 6.8.  Reports some indulgent eating over the holidays.  Currently on Mounjaro 10 mg weekly.  BMI 48 for which we discussed increasing Mounjaro from 10 mg to 12.5 mg for enhance weight loss as well as glycemic control.  Denies any polydipsia, polyphagia, or polyuria Otherwise denies any other acute medical concerns.    Wellness:12/27/2024     MEDICAL HISTORY:    Past Medical History:   Diagnosis Date    Allergy     Genital herpes     Irritable bowel syndrome,  unspecified type     Morbid obesity due to excess calories     PCOS (polycystic ovarian syndrome)     Type 2 diabetes mellitus with hyperglycemia, without long-term current use of insulin     Vitamin D insufficiency       History reviewed. No pertinent surgical history.   Social History[1]       Health Maintenance Due   Topic Date Due    Foot Exam  Never done    Pneumococcal Vaccines (Age 0-49) (1 of 2 - PCV) Never done    TETANUS VACCINE  06/18/2019    COVID-19 Vaccine (3 - 2024-25 season) 09/01/2024          Patient Care Team:  Paz Vega MD as PCP - General (Internal Medicine)  Shashank Blake OD (Optometry)  Caren Robertson LPN as Care Coordinator  Michelle Sheridan MD (Obstetrics and Gynecology)      Review of Systems   Constitutional:  Positive for fatigue. Negative for fever.   HENT:  Negative for congestion, rhinorrhea, sore throat and trouble swallowing.    Eyes:  Negative for redness and visual disturbance.   Respiratory:  Negative for cough, chest tightness and shortness of breath.    Cardiovascular:  Negative for chest pain and palpitations.   Gastrointestinal:  Negative for abdominal pain, constipation, diarrhea, nausea and vomiting.   Endocrine: Positive for polyphagia. Negative for polydipsia.   Genitourinary:  Negative for dysuria, flank pain, frequency and urgency.   Musculoskeletal:  Negative for arthralgias, gait problem and myalgias.   Skin:  Negative for pallor, rash and wound.   Neurological:  Negative for dizziness, tremors, seizures, facial asymmetry, speech difficulty, weakness and headaches.   Psychiatric/Behavioral:  Negative for confusion. The patient is not nervous/anxious.    All other systems reviewed and are negative.      Objective:   Ht 6' (1.829 m)   Wt (!) 161.9 kg (357 lb)   BMI 48.42 kg/m²      Physical Exam      **No physical exam completed due to telemedicine format    Assessment:       ICD-10-CM ICD-9-CM   1. Uncontrolled diabetes mellitus with hyperglycemia,  without long-term current use of insulin  E11.65 250.02   2. Vitamin D insufficiency  E55.9 268.9   3. Herpes simplex vulvovaginitis  A60.04 054.11   4. Anxiety  F41.9 300.00        Plan:   1. Uncontrolled diabetes mellitus with hyperglycemia, without long-term current use of insulin  Assessment & Plan:  Lab Results   Component Value Date    HGBA1C 7.3 (H) 04/24/2025    GLUCOSE 179 (H) 04/24/2025    -uncontrolled  -worsened from prior 6.8.  -currently on Mounjaro 10 mg, increase to 12.5 mg weekly  -endorses some indulgent eating, stressed importance of adherence to diabetic diet  -low-carbohydrate diet   -encouraged to obtain diabetic eye exam yearly   -encouraged to obtain diabetic foot exam yearly     Orders:  -     tirzepatide (MOUNJARO) 12.5 mg/0.5 mL PnIj; Inject 12.5 mg into the skin every 7 days.  Dispense: 6 mL; Refill: 1    2. Vitamin D insufficiency  Assessment & Plan:  -chronic   -currently on vitamin-D 69726 units twice weekly, continue      3. Herpes simplex vulvovaginitis  Assessment & Plan:  -stable   -currently on valacyclovir , continue      4. Anxiety  Assessment & Plan:  -stable   -currently on propranolol 60 mg daily, continue             Follow up in about 3 months (around 7/24/2025) for MUST be with Dr. Mijares.  If visit is rescheduled, CAN'T be with NP.  MUST be w/ DR. MIJARES.     -plan specifics discussed above    Orders Placed This Encounter    tirzepatide (MOUNJARO) 12.5 mg/0.5 mL PnIj        Medication List with Changes/Refills   New Medications    TIRZEPATIDE (MOUNJARO) 12.5 MG/0.5 ML PNIJ    Inject 12.5 mg into the skin every 7 days.   Current Medications    BLOOD SUGAR DIAGNOSTIC STRP    To check BG one times daily, to use with insurance preferred meter    BLOOD-GLUCOSE METER KIT    To check BG one times daily, to use with insurance preferred meter    ERGOCALCIFEROL (ERGOCALCIFEROL) 50,000 UNIT CAP    Take 1 capsule (50,000 Units total) by mouth twice a week.    FREESTYLE  LANCETS 28 GAUGE LANCETS    Apply topically.    LANCETS MISC    To check BG  one times daily, to use with insurance preferred meter    PROPRANOLOL (INDERAL LA) 60 MG 24 HR CAPSULE    Take 1 capsule (60 mg total) by mouth daily with dinner or evening meal.    VALACYCLOVIR (VALTREX) 1000 MG TABLET    Take 1 tablet (1,000 mg total) by mouth every 12 (twelve) hours.   Discontinued Medications    TIRZEPATIDE 10 MG/0.5 ML PNIJ    Inject 10 mg into the skin every 7 days.           [1]   Social History  Tobacco Use    Smoking status: Never    Smokeless tobacco: Never   Substance Use Topics    Alcohol use: Yes     Comment: once a month    Drug use: Never

## 2025-05-16 ENCOUNTER — PATIENT OUTREACH (OUTPATIENT)
Facility: CLINIC | Age: 30
End: 2025-05-16
Payer: COMMERCIAL

## 2025-05-16 NOTE — PROGRESS NOTES
Population Health Outreach.    Does Not Meet Criteria for Program  [facility] Value Based Care Coordination Program  [facility] VBC Auto Enrollment Filter Rule

## 2025-05-30 ENCOUNTER — TELEPHONE (OUTPATIENT)
Dept: INTERNAL MEDICINE | Facility: CLINIC | Age: 30
End: 2025-05-30
Payer: COMMERCIAL

## 2025-05-30 NOTE — TELEPHONE ENCOUNTER
Copied from CRM #[id number]. Topic: General Inquiry - Patient Advice  >> May 30, 2025  8:39 AM Jhonny wrote:  .Who Called: Joana Gloriamatt Gallo    Caller is requesting assistance/information from provider's office.    Symptoms (please be specific): Hives     How long has patient had these symptoms: few days    List of preferred pharmacies on file (remove unneeded): [unfilled]  If different, enter pharmacy into here including location and phone number: N/A    Preferred Method of Contact: Phone Call    Patient's Preferred Phone Number on File: [phone]     Best Call Back Number, if different:    Additional Information: Pt is requesting a cb from nurse, needing medical advice. Pt says she's been breaking out in hives and would like to know to do. Pt is currently out of town in [place]. Please advise, thank you.

## 2025-05-30 NOTE — TELEPHONE ENCOUNTER
Patient is in [place] and was advised to go to Urgent Care if hives continued. Verbalized understanding.

## 2025-07-23 DIAGNOSIS — E11.65 UNCONTROLLED DIABETES MELLITUS WITH HYPERGLYCEMIA, WITHOUT LONG-TERM CURRENT USE OF INSULIN: ICD-10-CM

## 2025-07-23 DIAGNOSIS — E11.65 TYPE 2 DIABETES MELLITUS WITH HYPERGLYCEMIA, WITHOUT LONG-TERM CURRENT USE OF INSULIN: Primary | ICD-10-CM

## 2025-07-24 ENCOUNTER — TELEPHONE (OUTPATIENT)
Dept: INTERNAL MEDICINE | Facility: CLINIC | Age: 30
End: 2025-07-24
Payer: COMMERCIAL

## 2025-07-24 NOTE — TELEPHONE ENCOUNTER
----- Message from Med Assistant Sampson sent at 7/23/2025  8:06 AM CDT -----  Regarding: PV Thursday 7-31-25       1. Are there any outstanding Labs, imaging or referrals in the patient's chart?        3 month f/u    Fasting labs ordered    Last wellness 12-27-24           2. . Has the patient been seen in an ER, urgent care clinic, or any other health care    provider since their last visit? If yes when and where?

## 2025-07-30 ENCOUNTER — TELEPHONE (OUTPATIENT)
Dept: INTERNAL MEDICINE | Facility: CLINIC | Age: 30
End: 2025-07-30
Payer: COMMERCIAL

## 2025-07-30 DIAGNOSIS — Z20.2 STD EXPOSURE: Primary | ICD-10-CM

## 2025-07-30 DIAGNOSIS — N92.6 ABNORMAL MENSTRUAL CYCLE: ICD-10-CM

## 2025-07-30 NOTE — TELEPHONE ENCOUNTER
Spoke with pt, she notes having a new sexual partner and that her menstrual cycle is 30 days late. She is requesting a blood pregnancy test as well as STI and HIV testing.

## 2025-07-31 ENCOUNTER — TELEPHONE (OUTPATIENT)
Dept: SURGERY | Facility: CLINIC | Age: 30
End: 2025-07-31
Payer: COMMERCIAL

## 2025-07-31 ENCOUNTER — LAB VISIT (OUTPATIENT)
Dept: LAB | Facility: HOSPITAL | Age: 30
End: 2025-07-31
Attending: INTERNAL MEDICINE
Payer: COMMERCIAL

## 2025-07-31 ENCOUNTER — OFFICE VISIT (OUTPATIENT)
Dept: INTERNAL MEDICINE | Facility: CLINIC | Age: 30
End: 2025-07-31
Payer: COMMERCIAL

## 2025-07-31 VITALS
WEIGHT: 293 LBS | SYSTOLIC BLOOD PRESSURE: 122 MMHG | HEART RATE: 94 BPM | BODY MASS INDEX: 39.68 KG/M2 | OXYGEN SATURATION: 98 % | DIASTOLIC BLOOD PRESSURE: 82 MMHG | HEIGHT: 72 IN

## 2025-07-31 DIAGNOSIS — E11.65 UNCONTROLLED DIABETES MELLITUS WITH HYPERGLYCEMIA, WITHOUT LONG-TERM CURRENT USE OF INSULIN: Primary | ICD-10-CM

## 2025-07-31 DIAGNOSIS — N92.6 ABNORMAL MENSTRUAL CYCLE: ICD-10-CM

## 2025-07-31 DIAGNOSIS — A60.04 HERPES SIMPLEX VULVOVAGINITIS: Chronic | ICD-10-CM

## 2025-07-31 DIAGNOSIS — Z20.2 STD EXPOSURE: ICD-10-CM

## 2025-07-31 DIAGNOSIS — E11.65 UNCONTROLLED DIABETES MELLITUS WITH HYPERGLYCEMIA, WITHOUT LONG-TERM CURRENT USE OF INSULIN: ICD-10-CM

## 2025-07-31 DIAGNOSIS — E66.813 CLASS 3 SEVERE OBESITY DUE TO EXCESS CALORIES WITH SERIOUS COMORBIDITY AND BODY MASS INDEX (BMI) OF 45.0 TO 49.9 IN ADULT: ICD-10-CM

## 2025-07-31 DIAGNOSIS — E11.65 TYPE 2 DIABETES MELLITUS WITH HYPERGLYCEMIA, WITHOUT LONG-TERM CURRENT USE OF INSULIN: ICD-10-CM

## 2025-07-31 LAB
ALBUMIN SERPL-MCNC: 3.9 G/DL (ref 3.5–5)
ALBUMIN/GLOB SERPL: 0.8 RATIO (ref 1.1–2)
ALP SERPL-CCNC: 51 UNIT/L (ref 40–150)
ALT SERPL-CCNC: 11 UNIT/L (ref 0–55)
ANION GAP SERPL CALC-SCNC: 10 MEQ/L
AST SERPL-CCNC: 13 UNIT/L (ref 11–45)
B-HCG SERPL QL: NEGATIVE
BACTERIA #/AREA URNS AUTO: ABNORMAL /HPF
BILIRUB SERPL-MCNC: 0.4 MG/DL
BILIRUB UR QL STRIP.AUTO: NEGATIVE
BUN SERPL-MCNC: 10.1 MG/DL (ref 7–18.7)
C TRACH DNA SPEC QL NAA+PROBE: NOT DETECTED
CALCIUM SERPL-MCNC: 9.4 MG/DL (ref 8.4–10.2)
CHLORIDE SERPL-SCNC: 106 MMOL/L (ref 98–107)
CLARITY UR: ABNORMAL
CO2 SERPL-SCNC: 23 MMOL/L (ref 22–29)
COLOR UR AUTO: YELLOW
CREAT SERPL-MCNC: 0.74 MG/DL (ref 0.55–1.02)
CREAT/UREA NIT SERPL: 14
EST. AVERAGE GLUCOSE BLD GHB EST-MCNC: 148.5 MG/DL
GFR SERPLBLD CREATININE-BSD FMLA CKD-EPI: >60 ML/MIN/1.73/M2
GLOBULIN SER-MCNC: 4.6 GM/DL (ref 2.4–3.5)
GLUCOSE SERPL-MCNC: 162 MG/DL (ref 74–100)
GLUCOSE UR QL STRIP: NORMAL
HBA1C MFR BLD: 6.8 %
HGB UR QL STRIP: NEGATIVE
HYALINE CASTS #/AREA URNS LPF: ABNORMAL /LPF
KETONES UR QL STRIP: NEGATIVE
LEUKOCYTE ESTERASE UR QL STRIP: NEGATIVE
MUCOUS THREADS URNS QL MICRO: ABNORMAL /LPF
N GONORRHOEA DNA SPEC QL NAA+PROBE: NOT DETECTED
NITRITE UR QL STRIP: NEGATIVE
PH UR STRIP: 5.5 [PH]
POTASSIUM SERPL-SCNC: 3.8 MMOL/L (ref 3.5–5.1)
PROT SERPL-MCNC: 8.5 GM/DL (ref 6.4–8.3)
PROT UR QL STRIP: ABNORMAL
RBC #/AREA URNS AUTO: ABNORMAL /HPF
SODIUM SERPL-SCNC: 139 MMOL/L (ref 136–145)
SP GR UR STRIP.AUTO: 1.02 (ref 1–1.03)
SPECIMEN SOURCE: NORMAL
SQUAMOUS #/AREA URNS LPF: ABNORMAL /HPF
T PALLIDUM AB SER QL: NONREACTIVE
UROBILINOGEN UR STRIP-ACNC: NORMAL
WBC #/AREA URNS AUTO: ABNORMAL /HPF

## 2025-07-31 PROCEDURE — 87591 N.GONORRHOEAE DNA AMP PROB: CPT

## 2025-07-31 PROCEDURE — 3008F BODY MASS INDEX DOCD: CPT | Mod: CPTII,,, | Performed by: INTERNAL MEDICINE

## 2025-07-31 PROCEDURE — 3044F HG A1C LEVEL LT 7.0%: CPT | Mod: CPTII,,, | Performed by: INTERNAL MEDICINE

## 2025-07-31 PROCEDURE — 83036 HEMOGLOBIN GLYCOSYLATED A1C: CPT

## 2025-07-31 PROCEDURE — 86780 TREPONEMA PALLIDUM: CPT

## 2025-07-31 PROCEDURE — 36415 COLL VENOUS BLD VENIPUNCTURE: CPT

## 2025-07-31 PROCEDURE — 81001 URINALYSIS AUTO W/SCOPE: CPT

## 2025-07-31 PROCEDURE — 1159F MED LIST DOCD IN RCRD: CPT | Mod: CPTII,,, | Performed by: INTERNAL MEDICINE

## 2025-07-31 PROCEDURE — 1160F RVW MEDS BY RX/DR IN RCRD: CPT | Mod: CPTII,,, | Performed by: INTERNAL MEDICINE

## 2025-07-31 PROCEDURE — 80053 COMPREHEN METABOLIC PANEL: CPT

## 2025-07-31 PROCEDURE — 99214 OFFICE O/P EST MOD 30 MIN: CPT | Mod: ,,, | Performed by: INTERNAL MEDICINE

## 2025-07-31 PROCEDURE — 84703 CHORIONIC GONADOTROPIN ASSAY: CPT

## 2025-07-31 PROCEDURE — 3074F SYST BP LT 130 MM HG: CPT | Mod: CPTII,,, | Performed by: INTERNAL MEDICINE

## 2025-07-31 PROCEDURE — 3079F DIAST BP 80-89 MM HG: CPT | Mod: CPTII,,, | Performed by: INTERNAL MEDICINE

## 2025-07-31 RX ORDER — TIRZEPATIDE 15 MG/.5ML
15 INJECTION, SOLUTION SUBCUTANEOUS
Qty: 4 PEN | Refills: 5 | Status: SHIPPED | OUTPATIENT
Start: 2025-07-31

## 2025-07-31 RX ORDER — ERGOCALCIFEROL 1.25 MG/1
50000 CAPSULE ORAL
COMMUNITY

## 2025-07-31 RX ORDER — CIPROFLOXACIN AND DEXAMETHASONE 3; 1 MG/ML; MG/ML
4 SUSPENSION/ DROPS AURICULAR (OTIC) 2 TIMES DAILY
Qty: 7.5 ML | Refills: 0 | Status: SHIPPED | OUTPATIENT
Start: 2025-07-31

## 2025-07-31 NOTE — ASSESSMENT & PLAN NOTE
-patient advised on the importance of avoiding excessive carbohydrates and sugary food intake and having some form of routine aerobic exercise on a regular basis.     Hemoglobin A1c   Date Value Ref Range Status   07/31/2025 6.8 <=7.0 % Final   04/24/2025 7.3 (H) <=7.0 % Final   12/27/2024 6.8 <=7.0 % Final      Continue Mounjaro, going up on the dose to 15 mg p.o. daily

## 2025-07-31 NOTE — PROGRESS NOTES
Subjective:      Patient ID: Joana Gallo is a 30 y.o. female.    Chief Complaint: Follow-up (3 month diabetes/)    Joana Gallo is a 30 y.o. female, here today for a follow-up for diabetes.     Medical comorbidities include  DM 2, PCOS, morbid obesity, vitamin-D insufficiency, and genital herpes on chronic suppression.  Currently on Mounjaro 12.5 mg p.o. daily with improvement in hemoglobin A1c to 6.8 however patient's weight loss journey has been very slow.    We discussed sending a referral for evaluation for bariatric surgery.    Also has chronic ear issues, previously has tubes in her ears.  However whenever she has a sinus infection her here start to act up.         Wellness:12/27/2024     The patient's Health Maintenance was reviewed and the following appears to be due at this time:   Health Maintenance Due   Topic Date Due    Foot Exam  Never done    Pneumococcal Vaccines (Age 0-49) (1 of 2 - PCV) Never done    TETANUS VACCINE  06/18/2019    COVID-19 Vaccine (3 - 2024-25 season) 09/01/2024    Diabetic Eye Exam  09/21/2025        Past Medical History:  Past Medical History:   Diagnosis Date    Allergy     Genital herpes     Irritable bowel syndrome, unspecified type     Morbid obesity due to excess calories     PCOS (polycystic ovarian syndrome)     Type 2 diabetes mellitus with hyperglycemia, without long-term current use of insulin     Vitamin D insufficiency      History reviewed. No pertinent surgical history.  Review of patient's allergies indicates:  No Known Allergies  Social History[1]  No family history on file.    Review of Systems    A comprehensive review of systems was performed and is negative except for that stated above  Objective:   /82 (BP Location: Left arm, Patient Position: Sitting)   Pulse 94   Ht 6' (1.829 m)   Wt (!) 161.5 kg (356 lb)   LMP 05/19/2025 (Exact Date)   SpO2 98%   BMI 48.28 kg/m²     Physical Exam  Constitutional:       Appearance: Normal  appearance. She is obese.   HENT:      Head: Normocephalic and atraumatic.      Ears:      Comments: Bilateral bulging tympanic membranes, right side noted with a small perforation at 7 o'clock position with some redness and old blood     Nose: Nose normal.      Mouth/Throat:      Mouth: Mucous membranes are moist.      Pharynx: Oropharynx is clear.   Eyes:      Extraocular Movements: Extraocular movements intact.      Pupils: Pupils are equal, round, and reactive to light.   Cardiovascular:      Rate and Rhythm: Normal rate and regular rhythm.      Pulses: Normal pulses.           Dorsalis pedis pulses are 2+ on the right side and 2+ on the left side.   Pulmonary:      Effort: Pulmonary effort is normal.      Breath sounds: Normal breath sounds.   Abdominal:      General: Bowel sounds are normal.      Palpations: Abdomen is soft.   Musculoskeletal:         General: Normal range of motion.      Cervical back: Normal range of motion and neck supple.   Feet:      Right foot:      Protective Sensation: 3 sites tested.  3 sites sensed.      Skin integrity: No ulcer, skin breakdown or erythema.      Toenail Condition: Right toenails are normal.      Left foot:      Protective Sensation: 3 sites tested.  3 sites sensed.      Skin integrity: No ulcer, skin breakdown or erythema.      Toenail Condition: Left toenails are normal.   Skin:     General: Skin is warm.   Neurological:      General: No focal deficit present.      Mental Status: She is alert and oriented to person, place, and time. Mental status is at baseline.   Psychiatric:         Mood and Affect: Mood normal.       Assessment/ Plan:   1. Uncontrolled diabetes mellitus with hyperglycemia, without long-term current use of insulin  Assessment & Plan:  -patient advised on the importance of avoiding excessive carbohydrates and sugary food intake and having some form of routine aerobic exercise on a regular basis.     Hemoglobin A1c   Date Value Ref Range Status    07/31/2025 6.8 <=7.0 % Final   04/24/2025 7.3 (H) <=7.0 % Final   12/27/2024 6.8 <=7.0 % Final      Continue Mounjaro, going up on the dose to 15 mg p.o. daily    Orders:  -     tirzepatide (MOUNJARO) 15 mg/0.5 mL PnIj; Inject 15 mg into the skin every 7 days.  Dispense: 4 Pen; Refill: 5    2. Class 3 severe obesity due to excess calories with serious comorbidity and body mass index (BMI) of 45.0 to 49.9 in adult  Assessment & Plan:  Body mass index is 48.28 kg/m².  Goal BMI <30.  Exercise 5 times a week for 30 minutes per day.  Avoid soda, simple sugars, excessive rice, potatoes or bread. Limit fast foods and fried foods.  Choose complex carbs in moderation (example: green vegetables, beans, oatmeal). Eat plenty of fresh fruits and vegetables with lean meats daily.  Do not skip meals. Eat a balanced portion size.  Avoid fad diets. Consider permanent healthy life style changes.      Orders:  -     Ambulatory referral/consult to Bariatric/Obesity Medicine; Future; Expected date: 08/07/2025    3. Herpes simplex vulvovaginitis  Assessment & Plan:  -stable   -currently on valacyclovir , continue                   [1]   Social History  Socioeconomic History    Marital status:    Tobacco Use    Smoking status: Never    Smokeless tobacco: Never   Substance and Sexual Activity    Alcohol use: Yes     Comment: once a month    Drug use: Never    Sexual activity: Yes     Partners: Female     Birth control/protection: None     Social Drivers of Health     Financial Resource Strain: Low Risk  (4/24/2025)    Overall Financial Resource Strain (CARDIA)     Difficulty of Paying Living Expenses: Not hard at all   Food Insecurity: No Food Insecurity (4/24/2025)    Hunger Vital Sign     Worried About Running Out of Food in the Last Year: Never true     Ran Out of Food in the Last Year: Never true   Transportation Needs: No Transportation Needs (4/24/2025)    PRAPARE - Transportation     Lack of Transportation (Medical): No      Lack of Transportation (Non-Medical): No   Physical Activity: Inactive (4/24/2025)    Exercise Vital Sign     Days of Exercise per Week: 0 days     Minutes of Exercise per Session: 0 min   Stress: No Stress Concern Present (4/24/2025)    [employer] [place] [employer] - Occupational Stress Questionnaire     Feeling of Stress : Not at all   Housing Stability: Low Risk  (4/24/2025)    Housing Stability Vital Sign     Unable to Pay for Housing in the Last Year: No     Homeless in the Last Year: No

## 2025-07-31 NOTE — LETTER
Fax Transmission                                                                                                                                                       Date: 2025       To:  Dr Sherman Harman From: Dr. Vega's Office   Fax:  [phone] Fax: [phone]   Phone:   Phone: [phone]     Special Instructions:     For questions or issues, please contact department listed on attached report.                  Referral           IF THERE ARE ANY PROBLEMS WITH THIS TRANSMISSION, PLEASE CALL IMMEDIATELY. THANK YOU    CONFIDENTIALITY NOTICE: The accompanying facsimile is intended solely for the use of the recipient designated above. Document(s) transmitted herewith may contain information that is confidential and privileged. Delivery, distribution of dissemination of this communication other than to the intended recipient is strictly prohibited. If you are another healthcare provider and have received this facsimile in error, please properly dispose and notify the sender. If you are NOT a healthcare provider and have received this facsimile in error, please notify [facility]s Compliance & Privacy Department immediately by email at [email or web address]                             [facility]  [address] [facility] [address]  Phone: [phone]  Fax: [phone]    Lauren Gallo                                                                                                 MRN: [id number]    Demographics  [address] [place] [address]  There are no phone numbers on file.    : 1995  Age: 30 y.o.  Sex: female     Guarantors & Coverages    Guarantor Account    Name: LAUREN GALLO  Address: [address] [place] [address]  City: [place]  State: [place]  Zip Code: [address]  Phone Number:     Service Area: [facility]  Account Type: Personal/Family  Guarantor Rel. To Patient: Self  Employment Status: Full  Time    Coverages    Primary  Payor: Payor: BLUE [employer]UE [employer]IELD / Plan[place] / Product Type: HMO /   [place]  Subscriber ID #: [id number] - (Commercial)  Group #:   Secondary  Payor:   Plan:   Subscriber ID #:   Group #:

## 2025-07-31 NOTE — ASSESSMENT & PLAN NOTE

## 2025-08-01 ENCOUNTER — TELEPHONE (OUTPATIENT)
Dept: INTERNAL MEDICINE | Facility: CLINIC | Age: 30
End: 2025-08-01
Payer: COMMERCIAL

## 2025-08-01 ENCOUNTER — LAB VISIT (OUTPATIENT)
Dept: LAB | Facility: HOSPITAL | Age: 30
End: 2025-08-01
Attending: INTERNAL MEDICINE
Payer: COMMERCIAL

## 2025-08-01 DIAGNOSIS — Z20.2 STD EXPOSURE: Primary | ICD-10-CM

## 2025-08-01 DIAGNOSIS — Z20.2 STD EXPOSURE: ICD-10-CM

## 2025-08-01 LAB — HIV 1+2 AB+HIV1 P24 AG SERPL QL IA: NONREACTIVE

## 2025-08-01 PROCEDURE — 87661 TRICHOMONAS VAGINALIS AMPLIF: CPT

## 2025-08-01 NOTE — TELEPHONE ENCOUNTER
Copied from CRM #[id number]. Topic: General Inquiry - Patient Advice  >> Aug 1, 2025  8:40 AM Olga Lidia wrote:  .Who Called: Joana Gallo    Patient is returning phone call    Who Left Message for Patient:  Does the patient know what this is regarding?:Pt requesting to add lab orders for trichomoniasis to get done today-thanks       Preferred Method of Contact: Phone Call  Patient's Preferred Phone Number on File: [phone]   Best Call Back Number, if different:  Additional Information:

## 2025-08-01 NOTE — TELEPHONE ENCOUNTER
Copied from CRM #[id number]. Topic: General Inquiry - Patient Advice  >> Aug 1, 2025  8:11 AM Jonas Rea wrote:  Who Called: Joana Gallo    What order is the patient requesting: lab orders for Trichomoniasis and HIV  When does the expect the orders to be performed? NA        Preferred Method of Contact: Phone Call  Patient's Preferred Phone Number on File: [phone]   Best Call Back Number, if different:  Additional Information: pt would like a cb once orders are in.

## 2025-08-01 NOTE — TELEPHONE ENCOUNTER
Copied from CRM #[id number]. Topic: General Inquiry - Patient Advice  >> Aug 1, 2025  8:56 AM Jonas Rea wrote:  Who Called: Aleah ayon/ [facility]    Caller is requesting assistance/information from provider's office.    Symptoms (please be specific): N/A   How long has patient had these symptoms:  N/A  List of preferred pharmacies on file (remove unneeded): [unfilled]  If different, enter pharmacy into here including location and phone number: N/A      Preferred Method of Contact: Phone Call  Patient's Preferred Phone Number on File: [phone]   Best Call Back Number, if different:  Additional Information: [facility] is needing reason for pt referral    Fax #[phone]

## 2025-08-01 NOTE — TELEPHONE ENCOUNTER
Trichomonas Vaginalis order was not showing up in the computer for the lab. I reorder the test Trichomonas vaginalis, RNA, Qual and the lab should see the order now. If not Pt will have to go to [facility] the do the test.

## 2025-08-02 LAB
SPECIMEN SOURCE: NORMAL
T VAGINALIS RRNA SPEC QL NAA+PROBE: NEGATIVE